# Patient Record
Sex: FEMALE | Race: WHITE | NOT HISPANIC OR LATINO | Employment: FULL TIME | URBAN - METROPOLITAN AREA
[De-identification: names, ages, dates, MRNs, and addresses within clinical notes are randomized per-mention and may not be internally consistent; named-entity substitution may affect disease eponyms.]

---

## 2017-01-26 ENCOUNTER — OFFICE VISIT (OUTPATIENT)
Dept: OBSTETRICS AND GYNECOLOGY | Facility: CLINIC | Age: 37
End: 2017-01-26

## 2017-01-26 VITALS
HEIGHT: 67 IN | WEIGHT: 163 LBS | HEART RATE: 64 BPM | BODY MASS INDEX: 25.58 KG/M2 | SYSTOLIC BLOOD PRESSURE: 107 MMHG | DIASTOLIC BLOOD PRESSURE: 71 MMHG

## 2017-01-26 DIAGNOSIS — R87.810 CERVICAL HIGH RISK HUMAN PAPILLOMAVIRUS (HPV) DNA TEST POSITIVE: Primary | ICD-10-CM

## 2017-01-26 PROCEDURE — 99213 OFFICE O/P EST LOW 20 MIN: CPT | Performed by: OBSTETRICS & GYNECOLOGY

## 2017-01-26 RX ORDER — LORATADINE 10 MG/1
10 TABLET ORAL
COMMUNITY
End: 2021-02-05

## 2017-01-26 RX ORDER — B-COMPLEX WITH VITAMIN C
1 TABLET ORAL DAILY
COMMUNITY

## 2017-01-26 RX ORDER — DESOGESTREL AND ETHINYL ESTRADIOL 0.15-0.03
KIT ORAL
Refills: 1 | COMMUNITY
Start: 2017-01-11 | End: 2017-03-06 | Stop reason: SDUPTHER

## 2017-01-26 RX ORDER — ESCITALOPRAM OXALATE 20 MG/1
20 TABLET ORAL DAILY
Refills: 0 | COMMUNITY
Start: 2016-12-07

## 2017-01-26 NOTE — MR AVS SNAPSHOT
"                        Akilah Sanford   2017 8:30 AM   Office Visit    Dept Phone:  [phone]   Encounter #:  [id number]    Provider:  Jeronimo Malik MD   Department:  [facility]                Your Full Care Plan              Your Updated Medication List          This list is accurate as of: 17  9:48 AM.  Always use your most recent med list.                escitalopram 20 MG tablet   Commonly known as:  LEXAPRO       JULEBER 0.15-30 MG-MCG per tablet   Generic drug:  desogestrel-ethinyl estradiol       loratadine 10 MG tablet   Commonly known as:  CLARITIN       multivitamin tablet               Instructions     None    Patient Instructions History      Upcoming Appointments     Visit Type Date Time Department    GYN FOLLOW UP 2017  8:30 AM [facility]      [employer] Signup     [facility] [employer] allows you to send messages to your doctor, view your test results, renew your prescriptions, schedule appointments, and more. To sign up, go to MyTime and click on the Sign Up Now link in the New User? box. Enter your [employer] Activation Code exactly as it appears below along with the last four digits of your Social Security Number and your Date of Birth () to complete the sign-up process. If you do not sign up before the expiration date, you must request a new code.    [employer] Activation Code: [email or web address]  Expires: 2017  9:48 AM    If you have questions, you can email [email or web address] or call [phone] to talk to our [employer] staff. Remember, [employer] is NOT to be used for urgent needs. For medical emergencies, dial 911.               Other Info from Your Visit           Allergies     No Known Allergies      Reason for Visit     Follow-up Pt has a history of HPV. Pt denied any problems at this time.      Vital Signs     Blood Pressure Pulse Height Weight Last Menstrual Period Body Mass Index    107/71 64 67\" " (170.2 cm) 163 lb (73.9 kg) 01/07/2017 (Exact Date) 25.53 kg/m2    Smoking Status                   Never Smoker           Problems and Diagnoses Noted     Nasal inflammation due to allergen    Depression

## 2017-01-26 NOTE — PROGRESS NOTES
Subjective   Akilah Sanford is a 36 y.o. female.   Cc: HPV  History of Present Illness  Patient's Pap smear showed HPV high risk positive with negative cytology.  The following portions of the patient's history were reviewed and updated as appropriate: allergies, current medications, past family history, past medical history, past social history, past surgical history and problem list.    Review of Systems   All other systems reviewed and are negative.      Objective   Physical Exam   Constitutional: She is oriented to person, place, and time. She appears well-developed and well-nourished.   Genitourinary: Vagina normal and uterus normal. Pelvic exam was performed with patient supine. There is no lesion on the right labia. There is no lesion on the left labia. Cervix exhibits no motion tenderness and no discharge. Right adnexum displays no mass, no tenderness and no fullness. Left adnexum displays no mass, no tenderness and no fullness.   Neurological: She is alert and oriented to person, place, and time.   Skin: Skin is warm and dry.   Psychiatric: She has a normal mood and affect. Her behavior is normal. Judgment and thought content normal.       Assessment/Plan   Akilah was seen today for follow-up.    Diagnoses and all orders for this visit:    Cervical high risk human papillomavirus (HPV) DNA test positive

## 2017-02-01 LAB
CYTOLOGIST CVX/VAG CYTO: NORMAL
CYTOLOGY CVX/VAG DOC THIN PREP: NORMAL
DX ICD CODE: NORMAL
HIV 1 & 2 AB SER-IMP: NORMAL
HPV I/H RISK 4 DNA CVX QL PROBE+SIG AMP: NEGATIVE
OTHER STN SPEC: NORMAL
PATH REPORT.FINAL DX SPEC: NORMAL
STAT OF ADQ CVX/VAG CYTO-IMP: NORMAL

## 2017-02-15 ENCOUNTER — TELEPHONE (OUTPATIENT)
Dept: OBSTETRICS AND GYNECOLOGY | Facility: CLINIC | Age: 37
End: 2017-02-15

## 2017-02-15 NOTE — TELEPHONE ENCOUNTER
----- Message from Jeronimo Malik MD sent at 2/15/2017  3:12 PM EST -----  Contact: pt  Call the patient and tell her that her Pap was negative  ----- Message -----     From: Cee Rowe     Sent: 2/15/2017   3:05 PM       To: Jeronimo Malik MD    Patient called asking for her pap results. Please advise. Pt no is [phone]

## 2017-03-06 RX ORDER — DESOGESTREL AND ETHINYL ESTRADIOL 0.15-0.03
1 KIT ORAL DAILY
Qty: 28 TABLET | Refills: 1 | Status: SHIPPED | OUTPATIENT
Start: 2017-03-06 | End: 2017-03-27 | Stop reason: SDUPTHER

## 2017-03-07 ENCOUNTER — TELEPHONE (OUTPATIENT)
Dept: OBSTETRICS AND GYNECOLOGY | Facility: CLINIC | Age: 37
End: 2017-03-07

## 2017-03-07 NOTE — TELEPHONE ENCOUNTER
----- Message from Jeronimo Malik MD sent at 3/6/2017  5:43 PM EST -----  Contact: PT CALLED   Tell Her the prescription is been sent to the pharmacy  ----- Message -----     From: Zoey Miguel MA     Sent: 3/6/2017   4:25 PM       To: Jeronimo Malik MD        ----- Message -----     From: Julio César Guardado     Sent: 3/6/2017   4:17 PM       To: Zoey Miguel MA    PT NEEDS  A REFILL ON BIRTH CONTROL (1 MO SUPPLY) ANNUAL HAS BEEN KEYA.    THANK YOU-    JULIO CÉSAR

## 2017-03-27 ENCOUNTER — OFFICE VISIT (OUTPATIENT)
Dept: OBSTETRICS AND GYNECOLOGY | Facility: CLINIC | Age: 37
End: 2017-03-27

## 2017-03-27 VITALS
HEART RATE: 55 BPM | SYSTOLIC BLOOD PRESSURE: 122 MMHG | BODY MASS INDEX: 24.8 KG/M2 | DIASTOLIC BLOOD PRESSURE: 79 MMHG | HEIGHT: 67 IN | WEIGHT: 158 LBS

## 2017-03-27 DIAGNOSIS — Z01.419 ENCOUNTER FOR GYNECOLOGICAL EXAMINATION WITHOUT ABNORMAL FINDING: ICD-10-CM

## 2017-03-27 DIAGNOSIS — Z30.41 ENCOUNTER FOR SURVEILLANCE OF CONTRACEPTIVE PILLS: Primary | ICD-10-CM

## 2017-03-27 PROCEDURE — 99395 PREV VISIT EST AGE 18-39: CPT | Performed by: OBSTETRICS & GYNECOLOGY

## 2017-03-27 RX ORDER — DESOGESTREL AND ETHINYL ESTRADIOL 0.15-0.03
1 KIT ORAL DAILY
Qty: 28 TABLET | Refills: 12 | Status: SHIPPED | OUTPATIENT
Start: 2017-03-27 | End: 2019-06-20

## 2017-03-27 NOTE — PROGRESS NOTES
Randolph Sanford is a 37 y.o. female  4, Para 3 AB 1, Living 3.  Last annual 1 year, last pap 1 month, last mammogram 0, last colonoscopy 0.    History of Present Illness    The following portions of the patient's history were reviewed and updated as appropriate: allergies, current medications, past family history, past medical history, past social history, past surgical history and problem list.    Review of Systems   Constitutional: Negative for activity change, fatigue, fever and unexpected weight change.   HENT: Negative for hearing loss, sore throat and voice change.    Respiratory: Negative for cough, chest tightness, shortness of breath and wheezing.    Cardiovascular: Negative for chest pain, palpitations and leg swelling.   Gastrointestinal: Negative for abdominal distention, abdominal pain, anal bleeding, blood in stool, constipation, diarrhea, nausea, rectal pain and vomiting.   Endocrine: Negative for cold intolerance and heat intolerance.   Genitourinary: Negative for difficulty urinating, dyspareunia, dysuria, flank pain, frequency, genital sores, menstrual problem, pelvic pain, urgency, vaginal bleeding, vaginal discharge and vaginal pain.   Musculoskeletal: Negative for arthralgias and back pain.   Skin: Negative for rash.   Allergic/Immunologic: Negative for environmental allergies and food allergies.   Neurological: Negative for dizziness, tremors, syncope, weakness, light-headedness, numbness and headaches.   Hematological: Negative for adenopathy. Does not bruise/bleed easily.   Psychiatric/Behavioral: Negative for agitation, behavioral problems, self-injury, sleep disturbance and suicidal ideas. The patient is not nervous/anxious and is not hyperactive.          Past Medical History:   Diagnosis Date   • History of HPV infection      Menstrual History:  OB History      Para Term  AB TAB SAB Ectopic Multiple Living    4 3 3  1  1   3         Menarche  "age:11  Patient's last menstrual period was 2017.  Period Pattern: Regular  Menstrual Flow: Moderate  Menstrual Control: Thin pad  Dysmenorrhea: None    Past Surgical History:   Procedure Laterality Date   •  SECTION      x2     OB History      Para Term  AB TAB SAB Ectopic Multiple Living    4 3 3  1  1   3        Family History   Problem Relation Age of Onset   • Diverticulitis Mother    • No Known Problems Son    • Throat cancer Paternal Grandfather    • No Known Problems Son      History   Smoking Status   • Never Smoker   Smokeless Tobacco   • Never Used     History   Alcohol Use   • 1.8 - 3.0 oz/week   • 3 - 5 Glasses of wine per week     Health Maintenance   Topic Date Due   • TDAP/TD VACCINES (1 - Tdap) 1997   • INFLUENZA VACCINE  2016       Current Outpatient Prescriptions:   •  escitalopram (LEXAPRO) 20 MG tablet, take 1 tablet by mouth once daily, Disp: , Rfl: 0  •  JULEBER 0.15-30 MG-MCG per tablet, Take 1 tablet by mouth Daily., Disp: 28 tablet, Rfl: 12  •  loratadine (CLARITIN) 10 MG tablet, Take 10 mg by mouth., Disp: , Rfl:   •  Multiple Vitamins-Minerals (MULTIVITAMIN) tablet, Take 1 tablet by mouth., Disp: , Rfl:   Sexual History: Active    STD: HPV      Objective   Vitals:    17 1553   BP: 122/79   Pulse: 55   Weight: 158 lb (71.7 kg)   Height: 67\" (170.2 cm)     Physical Exam   Constitutional: She is oriented to person, place, and time. She appears well-developed and well-nourished.   HENT:   Head: Normocephalic.   Eyes: Pupils are equal, round, and reactive to light.   Neck: Normal range of motion. No thyromegaly present.   Cardiovascular: Normal rate, regular rhythm, normal heart sounds and intact distal pulses.    Pulmonary/Chest: Effort normal and breath sounds normal. No respiratory distress. She exhibits no tenderness. Right breast exhibits no inverted nipple, no mass, no nipple discharge, no skin change and no tenderness. Left breast exhibits " no inverted nipple, no mass, no nipple discharge, no skin change and no tenderness. Breasts are symmetrical.   Abdominal: Soft. Bowel sounds are normal. Hernia confirmed negative in the right inguinal area and confirmed negative in the left inguinal area.   Genitourinary: Rectum normal, vagina normal and uterus normal. Rectal exam shows no external hemorrhoid, no internal hemorrhoid, no fissure, no mass, no tenderness and anal tone normal. No breast tenderness or discharge. Pelvic exam was performed with patient supine. There is no rash, tenderness, lesion or injury on the right labia. There is no rash, tenderness, lesion or injury on the left labia. Uterus is not enlarged and not tender. Cervix exhibits no motion tenderness, no discharge and no friability. Right adnexum displays no mass, no tenderness and no fullness. Left adnexum displays no mass, no tenderness and no fullness.   Lymphadenopathy:     She has no cervical adenopathy.        Right: No inguinal adenopathy present.        Left: No inguinal adenopathy present.   Neurological: She is alert and oriented to person, place, and time. She has normal reflexes.   Skin: Skin is warm and dry.   Psychiatric: She has a normal mood and affect. Her behavior is normal. Judgment and thought content normal.         Assessment/Plan   Akilah was seen today for gynecologic exam.    Diagnoses and all orders for this visit:    Encounter for surveillance of contraceptive pills    Encounter for gynecological examination without abnormal finding    Other orders  -     JULEBER 0.15-30 MG-MCG per tablet; Take 1 tablet by mouth Daily.

## 2019-06-20 ENCOUNTER — OFFICE VISIT (OUTPATIENT)
Dept: OBSTETRICS AND GYNECOLOGY | Facility: CLINIC | Age: 39
End: 2019-06-20

## 2019-06-20 VITALS
SYSTOLIC BLOOD PRESSURE: 118 MMHG | BODY MASS INDEX: 22.51 KG/M2 | HEIGHT: 67 IN | DIASTOLIC BLOOD PRESSURE: 67 MMHG | WEIGHT: 143.4 LBS

## 2019-06-20 DIAGNOSIS — Z00.00 ANNUAL PHYSICAL EXAM: Primary | ICD-10-CM

## 2019-06-20 PROCEDURE — 99396 PREV VISIT EST AGE 40-64: CPT | Performed by: OBSTETRICS & GYNECOLOGY

## 2019-06-20 NOTE — PROGRESS NOTES
ALYSSA Sanford  is a 39 y.o. female who presents for a routine gynecologic exam.  She has no complaints today.  Her cycles are regular and predictable.    Chief Complaint   Patient presents with   • Gynecologic Exam     Patient is here for a annual.       Past Medical History:   Diagnosis Date   • History of HPV infection        Past Surgical History:   Procedure Laterality Date   •  SECTION      x2       Social History     Socioeconomic History   • Marital status:      Spouse name: Not on file   • Number of children: Not on file   • Years of education: Not on file   • Highest education level: Not on file   Tobacco Use   • Smoking status: Never Smoker   • Smokeless tobacco: Never Used   Substance and Sexual Activity   • Alcohol use: Yes     Alcohol/week: 1.8 - 3.0 oz     Types: 3 - 5 Glasses of wine per week   • Drug use: Yes     Types: Marijuana     Comment: Past history   • Sexual activity: Yes     Partners: Male     Birth control/protection: Pill     Comment: Vasectomy       The following portions of the patient's history were reviewed and updated as appropriate: allergies, current medications, past family history, past medical history, past social history, past surgical history and problem list.    Review of Systems   Constitutional: Negative.    HENT: Negative.    Respiratory: Negative.    Cardiovascular: Negative.    Gastrointestinal: Negative.    Genitourinary: Negative.    Musculoskeletal: Negative.    Skin: Negative.    Allergic/Immunologic: Negative.    Psychiatric/Behavioral: Negative.              Physical Exam   Constitutional: She is oriented to person, place, and time. She appears well-developed and well-nourished.   HENT:   Head: Normocephalic and atraumatic.   Cardiovascular: Normal rate and regular rhythm.   Pulmonary/Chest: Effort normal and breath sounds normal. She has no wheezes. She has no rales.   The breasts are homogeneous.  There are no palpable lumps.  Nipple  discharge and axillary adenopathy are absent.   Abdominal: Soft. She exhibits no distension. There is no tenderness.   Genitourinary: Vagina normal and uterus normal. There is no lesion on the right labia. There is no lesion on the left labia. Cervix exhibits no motion tenderness. Right adnexum displays no mass and no tenderness. Left adnexum displays no mass and no tenderness. No vaginal discharge found.   Neurological: She is alert and oriented to person, place, and time.   Skin: Skin is warm and dry.   Nursing note and vitals reviewed.      Assessment    Akilah was seen today for gynecologic exam.    Diagnoses and all orders for this visit:    Annual physical exam        Plan  1. Annual examination and Pap smear performed  2. Counseled regarding ACOG recommendations for mammography every 1 to 2 years between the ages of 40 and 50.  I recommend a mammogram this year.  The patient agrees.    3. Return in about 1 year (around 6/20/2020).    Social History     Tobacco Use   Smoking Status Never Smoker   4.     5.

## 2019-06-26 LAB
CONV .: ABNORMAL
CYTOLOGIST CVX/VAG CYTO: ABNORMAL
CYTOLOGY CVX/VAG DOC CYTO: ABNORMAL
CYTOLOGY CVX/VAG DOC THIN PREP: ABNORMAL
DX ICD CODE: ABNORMAL
DX ICD CODE: ABNORMAL
HIV 1 & 2 AB SER-IMP: ABNORMAL
HPV I/H RISK 1 DNA CVX QL PROBE+SIG AMP: NEGATIVE
OTHER STN SPEC: ABNORMAL
PATHOLOGIST CVX/VAG CYTO: ABNORMAL
RECOM F/U CVX/VAG CYTO: ABNORMAL
STAT OF ADQ CVX/VAG CYTO-IMP: ABNORMAL

## 2019-06-27 ENCOUNTER — PROCEDURE VISIT (OUTPATIENT)
Dept: OBSTETRICS AND GYNECOLOGY | Facility: CLINIC | Age: 39
End: 2019-06-27

## 2019-06-27 ENCOUNTER — APPOINTMENT (OUTPATIENT)
Dept: WOMENS IMAGING | Facility: HOSPITAL | Age: 39
End: 2019-06-27

## 2019-06-27 DIAGNOSIS — Z12.31 VISIT FOR SCREENING MAMMOGRAM: Primary | ICD-10-CM

## 2019-06-27 PROCEDURE — 77067 SCR MAMMO BI INCL CAD: CPT | Performed by: RADIOLOGY

## 2019-06-27 PROCEDURE — 77067 SCR MAMMO BI INCL CAD: CPT | Performed by: OBSTETRICS & GYNECOLOGY

## 2019-07-02 DIAGNOSIS — R92.1 BREAST CALCIFICATIONS ON MAMMOGRAM: Primary | ICD-10-CM

## 2019-07-08 ENCOUNTER — APPOINTMENT (OUTPATIENT)
Dept: WOMENS IMAGING | Facility: HOSPITAL | Age: 39
End: 2019-07-08

## 2019-07-08 PROCEDURE — 77065 DX MAMMO INCL CAD UNI: CPT | Performed by: RADIOLOGY

## 2019-07-09 ENCOUNTER — TELEPHONE (OUTPATIENT)
Dept: OBSTETRICS AND GYNECOLOGY | Facility: CLINIC | Age: 39
End: 2019-07-09

## 2019-07-09 NOTE — TELEPHONE ENCOUNTER
----- Message from Haaj Traylor MD sent at 6/28/2019  3:41 PM EDT -----  Please contact the patient and let her know that her Pap showed atypical cells of undetermined significance.  HPV testing was negative.  Please help her to schedule a repeat Pap for December.  Thank you.

## 2019-07-15 DIAGNOSIS — R92.1 BREAST CALCIFICATIONS ON MAMMOGRAM: ICD-10-CM

## 2019-07-17 ENCOUNTER — DOCUMENTATION (OUTPATIENT)
Dept: OBSTETRICS AND GYNECOLOGY | Facility: CLINIC | Age: 39
End: 2019-07-17

## 2019-07-17 ENCOUNTER — TELEPHONE (OUTPATIENT)
Dept: OBSTETRICS AND GYNECOLOGY | Facility: CLINIC | Age: 39
End: 2019-07-17

## 2019-07-17 NOTE — PROGRESS NOTES
Phone call.  Results were reviewed and discussed with the patient.  The diagnostic mammogram was category 1.  The patient is satisfied with this.

## 2019-07-17 NOTE — TELEPHONE ENCOUNTER
Pt called, requesting the results of her repeat mammo she had done. Please advise.       Pt callback: [phone]

## 2020-07-24 ENCOUNTER — TRANSCRIBE ORDERS (OUTPATIENT)
Dept: ADMINISTRATIVE | Facility: HOSPITAL | Age: 40
End: 2020-07-24

## 2020-07-24 DIAGNOSIS — Z12.31 VISIT FOR SCREENING MAMMOGRAM: Primary | ICD-10-CM

## 2021-02-05 ENCOUNTER — PROCEDURE VISIT (OUTPATIENT)
Dept: OBSTETRICS AND GYNECOLOGY | Facility: CLINIC | Age: 41
End: 2021-02-05

## 2021-02-05 ENCOUNTER — OFFICE VISIT (OUTPATIENT)
Dept: OBSTETRICS AND GYNECOLOGY | Facility: CLINIC | Age: 41
End: 2021-02-05

## 2021-02-05 ENCOUNTER — APPOINTMENT (OUTPATIENT)
Dept: WOMENS IMAGING | Facility: HOSPITAL | Age: 41
End: 2021-02-05

## 2021-02-05 VITALS
HEIGHT: 67 IN | SYSTOLIC BLOOD PRESSURE: 118 MMHG | WEIGHT: 156 LBS | BODY MASS INDEX: 24.48 KG/M2 | DIASTOLIC BLOOD PRESSURE: 62 MMHG

## 2021-02-05 DIAGNOSIS — Z01.411 ENCOUNTER FOR GYNECOLOGICAL EXAMINATION WITH ABNORMAL FINDING: Primary | ICD-10-CM

## 2021-02-05 DIAGNOSIS — Z12.31 VISIT FOR SCREENING MAMMOGRAM: Primary | ICD-10-CM

## 2021-02-05 DIAGNOSIS — N92.6 IRREGULAR MENSTRUAL CYCLE: ICD-10-CM

## 2021-02-05 DIAGNOSIS — Z00.00 ANNUAL PHYSICAL EXAM: ICD-10-CM

## 2021-02-05 PROCEDURE — 77063 BREAST TOMOSYNTHESIS BI: CPT | Performed by: OBSTETRICS & GYNECOLOGY

## 2021-02-05 PROCEDURE — 99396 PREV VISIT EST AGE 40-64: CPT | Performed by: OBSTETRICS & GYNECOLOGY

## 2021-02-05 PROCEDURE — 99212 OFFICE O/P EST SF 10 MIN: CPT | Performed by: OBSTETRICS & GYNECOLOGY

## 2021-02-05 PROCEDURE — 77067 SCR MAMMO BI INCL CAD: CPT | Performed by: OBSTETRICS & GYNECOLOGY

## 2021-02-05 PROCEDURE — 77063 BREAST TOMOSYNTHESIS BI: CPT | Performed by: RADIOLOGY

## 2021-02-05 PROCEDURE — 77067 SCR MAMMO BI INCL CAD: CPT | Performed by: RADIOLOGY

## 2021-02-05 NOTE — PROGRESS NOTES
ALYSSA   Akilah Sanford  is a 40 y.o. female who presents for 2 reasons.  First, she is here for routine gynecologic exam.  Overall, she is feeling well.  Bowels and bladder are functioning normally.  Mammogram was performed today.  Next, the patient reports a change in her menstrual cycle.  Historically, her cycle has occurred once monthly and lasted 4 to 5 days.  It has been regular and predictable.  In December, she only had a 13-day interval between cycles.  Since then, the cycles occurring once monthly since the added cycle occurred.  She does not have intermenstrual bleeding.  She does not have pelvic or abdominal pain.  She does have hot flashes and night sweats as well as mood swings and has noticed vaginal dryness.    Chief Complaint   Patient presents with   • Gynecologic Exam     Patient is here for a annual. Last month patient reports 13 day cycle.       Past Medical History:   Diagnosis Date   • History of HPV infection        Past Surgical History:   Procedure Laterality Date   •  SECTION      x2       Social History     Socioeconomic History   • Marital status:      Spouse name: Not on file   • Number of children: Not on file   • Years of education: Not on file   • Highest education level: Not on file   Tobacco Use   • Smoking status: Never Smoker   • Smokeless tobacco: Never Used   Substance and Sexual Activity   • Alcohol use: Yes     Alcohol/week: 3.0 - 5.0 standard drinks     Types: 3 - 5 Glasses of wine per week   • Drug use: Yes     Types: Marijuana     Comment: Past history   • Sexual activity: Yes     Partners: Male     Birth control/protection: Partner's vasectomy     Comment: Vasectomy       The following portions of the patient's history were reviewed and updated as appropriate: allergies, current medications, past family history, past medical history, past social history, past surgical history and problem list.    Review of Systems      This is positive for hot flashes and  night sweats.  It is positive for mood swings and vaginal dryness.  It is negative for intermenstrual bleeding.  It is negative for fever or chills.  Negative for cold intolerance or fatigue.  Negative for headaches or vision changes.  All other systems are reviewed and are negative.    Physical Exam  Vitals signs and nursing note reviewed.   Constitutional:       Appearance: She is well-developed.   HENT:      Head: Normocephalic and atraumatic.   Cardiovascular:      Rate and Rhythm: Normal rate and regular rhythm.   Pulmonary:      Effort: Pulmonary effort is normal.      Breath sounds: Normal breath sounds. No wheezing or rales.   Chest:      Comments: The breasts are homogeneous.  There are no palpable lumps.  Nipple discharge and axillary adenopathy are absent.  Abdominal:      General: There is no distension.      Palpations: Abdomen is soft.      Tenderness: There is no abdominal tenderness.   Genitourinary:     Labia:         Right: No lesion.         Left: No lesion.       Vagina: Normal. No vaginal discharge.      Cervix: No cervical motion tenderness.      Uterus: Normal. Not enlarged and not tender.       Adnexa:         Right: No mass or tenderness.          Left: No mass or tenderness.     Skin:     General: Skin is warm and dry.   Neurological:      Mental Status: She is alert and oriented to person, place, and time.         Assessment    Diagnoses and all orders for this visit:    1. Encounter for gynecological examination with abnormal finding (Primary)  -     IGP, Rfx Aptima HPV ASCU    2. Annual physical exam    3. Irregular menstrual cycle        Plan  1. Annual examination performed  2. Counseled regarding a cog recommendations for mammography every 1 to 2 years between the ages of 40 and 50.  Mammogram was performed today.  3. Change in the patient's menstrual cycle, which has been accompanied by hot flashes, night sweats, mood swings and vaginal dryness.  Counseled and questions answered.  We  discussed the possibility of checking a follicle-stimulating hormone to assess for impending menopause.  Also, we discussed the possibility of a thyroid-stimulating hormone, as this could represent hypo or hyperthyroidism.  The patient declines this.  We discussed the possibility of managing symptoms with oral contraceptive pills.  The patient declines this as well.  She will call if she would like to pursue further work-up.    4. Return in about 1 year (around 2/5/2022).    Social History     Tobacco Use   Smoking Status Never Smoker   5.

## 2021-02-09 DIAGNOSIS — N64.89 BREAST ASYMMETRY: ICD-10-CM

## 2021-02-09 DIAGNOSIS — R92.8 ABNORMALITY OF RIGHT BREAST ON SCREENING MAMMOGRAM: Primary | ICD-10-CM

## 2021-02-09 LAB
CONV .: NORMAL
CYTOLOGIST CVX/VAG CYTO: NORMAL
CYTOLOGY CVX/VAG DOC CYTO: NORMAL
CYTOLOGY CVX/VAG DOC THIN PREP: NORMAL
DX ICD CODE: NORMAL
HIV 1 & 2 AB SER-IMP: NORMAL
OTHER STN SPEC: NORMAL
STAT OF ADQ CVX/VAG CYTO-IMP: NORMAL

## 2021-02-10 ENCOUNTER — TELEPHONE (OUTPATIENT)
Dept: OBSTETRICS AND GYNECOLOGY | Facility: CLINIC | Age: 41
End: 2021-02-10

## 2021-03-02 ENCOUNTER — APPOINTMENT (OUTPATIENT)
Dept: WOMENS IMAGING | Facility: HOSPITAL | Age: 41
End: 2021-03-02

## 2021-03-02 ENCOUNTER — TELEPHONE (OUTPATIENT)
Dept: OBSTETRICS AND GYNECOLOGY | Facility: CLINIC | Age: 41
End: 2021-03-02

## 2021-03-02 ENCOUNTER — LAB (OUTPATIENT)
Dept: OBSTETRICS AND GYNECOLOGY | Facility: CLINIC | Age: 41
End: 2021-03-02

## 2021-03-02 DIAGNOSIS — N93.9 ABNORMAL UTERINE BLEEDING (AUB): ICD-10-CM

## 2021-03-02 DIAGNOSIS — N93.9 ABNORMAL UTERINE BLEEDING (AUB): Primary | ICD-10-CM

## 2021-03-02 PROCEDURE — 76641 ULTRASOUND BREAST COMPLETE: CPT | Performed by: RADIOLOGY

## 2021-03-02 PROCEDURE — 77065 DX MAMMO INCL CAD UNI: CPT | Performed by: RADIOLOGY

## 2021-03-02 PROCEDURE — 77061 BREAST TOMOSYNTHESIS UNI: CPT | Performed by: RADIOLOGY

## 2021-03-02 PROCEDURE — G0279 TOMOSYNTHESIS, MAMMO: HCPCS | Performed by: RADIOLOGY

## 2021-03-02 NOTE — TELEPHONE ENCOUNTER
Dr. Traylor    Since seeing you, patient states her breasts are very tender. She can hardly touch them. She is also having break through bleeding. States she is spotting every day. She would like to go ahead and do the further work up you two had discussed last month.     Does she need to come in for a visit or is this just coming in for labs?     Please advise, thank you.

## 2021-03-03 LAB
FSH SERPL-ACNC: 3.9 MIU/ML
PROLACTIN SERPL-MCNC: 30.7 NG/ML (ref 4.8–23.3)
TSH SERPL DL<=0.005 MIU/L-ACNC: 0.62 UIU/ML (ref 0.27–4.2)

## 2021-03-04 DIAGNOSIS — R92.8 ABNORMALITY OF RIGHT BREAST ON SCREENING MAMMOGRAM: ICD-10-CM

## 2021-03-04 DIAGNOSIS — N64.89 BREAST ASYMMETRY: ICD-10-CM

## 2021-03-05 ENCOUNTER — TELEPHONE (OUTPATIENT)
Dept: OBSTETRICS AND GYNECOLOGY | Facility: CLINIC | Age: 41
End: 2021-03-05

## 2021-03-08 ENCOUNTER — TELEPHONE (OUTPATIENT)
Dept: OBSTETRICS AND GYNECOLOGY | Facility: CLINIC | Age: 41
End: 2021-03-08

## 2021-03-08 ENCOUNTER — DOCUMENTATION (OUTPATIENT)
Dept: OBSTETRICS AND GYNECOLOGY | Facility: CLINIC | Age: 41
End: 2021-03-08

## 2021-03-08 DIAGNOSIS — E22.1 HYPERPROLACTINEMIA (HCC): Primary | ICD-10-CM

## 2021-03-08 NOTE — PROGRESS NOTES
Phone call.  Results were reviewed and discussed with the patient.  Prolactin levels were elevated.  We discussed possible causes of hyperprolactinemia.  We discussed the fact that Lexapro can be a possible cause.  The patient would like to avoid discontinuing this medication if at all possible.  She does have some breast tenderness and is concerned about the possibility of a pituitary adenoma.  We discussed my recommendations to do an MRI scan of the pituitary.  The patient agrees with this recommendation.  This test has been ordered.

## 2021-03-08 NOTE — TELEPHONE ENCOUNTER
Dr. Traylor     Patient called on 3/5 asking for a return phone call in regards to her labs. [facility] and she is concerned about her Prolactin levels. Would like to discuss with you about these results and what the next step is.     [phone]

## 2021-04-05 ENCOUNTER — HOSPITAL ENCOUNTER (OUTPATIENT)
Dept: MRI IMAGING | Facility: HOSPITAL | Age: 41
Discharge: HOME | End: 2021-04-05
Admitting: OBSTETRICS & GYNECOLOGY

## 2021-04-05 DIAGNOSIS — E22.1 HYPERPROLACTINEMIA (HCC): ICD-10-CM

## 2021-04-05 PROCEDURE — A9577 INJ MULTIHANCE: HCPCS | Performed by: OBSTETRICS & GYNECOLOGY

## 2021-04-05 PROCEDURE — 70553 MRI BRAIN STEM W/O & W/DYE: CPT

## 2021-04-05 PROCEDURE — 0 GADOBENATE DIMEGLUMINE 529 MG/ML SOLUTION: Performed by: OBSTETRICS & GYNECOLOGY

## 2021-04-05 RX ADMIN — GADOBENATE DIMEGLUMINE 15 ML: 529 INJECTION, SOLUTION INTRAVENOUS at 15:41

## 2021-04-06 ENCOUNTER — BULK ORDERING (OUTPATIENT)
Dept: CASE MANAGEMENT | Facility: OTHER | Age: 41
End: 2021-04-06

## 2021-04-06 DIAGNOSIS — Z23 IMMUNIZATION DUE: ICD-10-CM

## 2021-04-07 DIAGNOSIS — F32.A DEPRESSION, UNSPECIFIED DEPRESSION TYPE: ICD-10-CM

## 2021-04-07 DIAGNOSIS — E22.1 HYPERPROLACTINEMIA (HCC): ICD-10-CM

## 2021-04-07 DIAGNOSIS — J32.0 MAXILLARY SINUSITIS, UNSPECIFIED CHRONICITY: ICD-10-CM

## 2021-04-07 DIAGNOSIS — I63.9 WHITE MATTER PERIVENTRICULAR INFARCTION (HCC): ICD-10-CM

## 2021-04-07 DIAGNOSIS — E22.1 HYPERPROLACTINEMIA (HCC): Primary | ICD-10-CM

## 2021-04-08 LAB — PROLACTIN SERPL-MCNC: 9.9 NG/ML (ref 4.8–23.3)

## 2021-04-21 ENCOUNTER — TELEPHONE (OUTPATIENT)
Dept: OBSTETRICS AND GYNECOLOGY | Facility: CLINIC | Age: 41
End: 2021-04-21

## 2021-04-21 NOTE — TELEPHONE ENCOUNTER
Spoke with patient and she is aware of stable findings from recent DX Mammo & US.  She will contact [facility] to schedule her 6 mth follow up for Sept 2021.  I also have her on file to follow.  SR

## 2021-09-03 DIAGNOSIS — N64.89 BREAST ASYMMETRY: ICD-10-CM

## 2021-09-03 DIAGNOSIS — Z09 FOLLOW-UP EXAM, 3-6 MONTHS SINCE PREVIOUS EXAM: Primary | ICD-10-CM

## 2021-09-13 ENCOUNTER — APPOINTMENT (OUTPATIENT)
Dept: WOMENS IMAGING | Facility: HOSPITAL | Age: 41
End: 2021-09-13

## 2021-09-13 PROCEDURE — G0279 TOMOSYNTHESIS, MAMMO: HCPCS | Performed by: RADIOLOGY

## 2021-09-13 PROCEDURE — 77065 DX MAMMO INCL CAD UNI: CPT | Performed by: RADIOLOGY

## 2021-09-13 PROCEDURE — 76641 ULTRASOUND BREAST COMPLETE: CPT | Performed by: RADIOLOGY

## 2021-09-13 PROCEDURE — 77061 BREAST TOMOSYNTHESIS UNI: CPT | Performed by: RADIOLOGY

## 2021-09-15 ENCOUNTER — OFFICE VISIT (OUTPATIENT)
Dept: OBSTETRICS AND GYNECOLOGY | Facility: CLINIC | Age: 41
End: 2021-09-15

## 2021-09-15 VITALS
WEIGHT: 145 LBS | SYSTOLIC BLOOD PRESSURE: 112 MMHG | HEIGHT: 67 IN | BODY MASS INDEX: 22.76 KG/M2 | DIASTOLIC BLOOD PRESSURE: 62 MMHG

## 2021-09-15 DIAGNOSIS — N92.0 MENORRHAGIA WITH REGULAR CYCLE: Primary | ICD-10-CM

## 2021-09-15 PROCEDURE — 99214 OFFICE O/P EST MOD 30 MIN: CPT | Performed by: OBSTETRICS & GYNECOLOGY

## 2021-09-15 NOTE — PROGRESS NOTES
ALYSSA Sanford  is a 41 y.o. female who presents for evaluation of menorrhagia.  The patient reports that her cycles have been regular, predictable and very heavy.  They last approximately 7 days.  Most recently, her August.  Occurred at its appropriate time.  It continued, however, for 2 weeks.  On Monday night, the patient passed 2 clots with tissue.  Since then, her bleeding has decreased considerably and her pain has resolved.  We reviewed the previous work-up which includes a prolactin level.  This was initially elevated and returned to normal on repeat evaluation.  Also, an MRI scan was performed.  This showed no pituitary adenoma, but did show some sinus abnormalities as well as areas of encephalomalacia.  The patient has been counseled regarding these findings and I reviewed them again today.  We discussed my recommendations for a neurology visit to further delineate the meaning of these findings.  The patient declines this.    Chief Complaint   Patient presents with   • Follow-up     Patient is here for irregular cycles and heavy with pain.       Past Medical History:   Diagnosis Date   • History of HPV infection        Past Surgical History:   Procedure Laterality Date   •  SECTION      x2       Social History     Socioeconomic History   • Marital status:      Spouse name: Not on file   • Number of children: Not on file   • Years of education: Not on file   • Highest education level: Not on file   Tobacco Use   • Smoking status: Never Smoker   • Smokeless tobacco: Never Used   Vaping Use   • Vaping Use: Never used   Substance and Sexual Activity   • Alcohol use: Yes     Alcohol/week: 3.0 - 5.0 standard drinks     Types: 3 - 5 Glasses of wine per week   • Drug use: Yes     Types: Marijuana     Comment: Past history   • Sexual activity: Yes     Partners: Male     Birth control/protection: Partner's vasectomy     Comment: Vasectomy       The following portions of the patient's history  were reviewed and updated as appropriate: allergies, current medications, past family history, past medical history, past social history, past surgical history and problem list.    Review of Systems      This is positive for menorrhagia.  It is positive for dysmenorrhea.  It is negative for fever or chills.  Negative for nausea or vomiting.  Negative for unexplained weight change.  Negative for sweats or itching.    Physical Exam  Vitals and nursing note reviewed.   Constitutional:       Appearance: Normal appearance.   Abdominal:      General: There is no distension.      Palpations: Abdomen is soft.      Tenderness: There is no abdominal tenderness.   Genitourinary:     Comments: Normal female external genitalia  The vagina is estrogenized with minimal old blood in the vaginal vault  The cervix is normal in appearance.  It is not tender to palpation.  The uterus is anteverted.  It is mobile within the pelvis.  There is mild tenderness on palpation of the fundus  No adnexal masses are palpable  Neurological:      Mental Status: She is alert and oriented to person, place, and time.         Assessment    Diagnoses and all orders for this visit:    1. Menorrhagia with regular cycle (Primary)        Plan  1. Worsening menorrhagia.  Counseled regarding the pathophysiology of this condition as well as possible causes and treatments.  30 minutes of a 40-minute visit were spent in direct face-to-face counseling on this issue.  On pelvic examination, the uterus is anteverted but normal in size.  The patient is not actively hemorrhaging at this time.  By history, it is possible that the patient has passed an endometrial polyp.  It is uncertain if there might be additional endometrial polyps remaining.  I recommend an ultrasound to further delineate this.  If polyps are found, consideration could be given to hysteroscopy, dilation and curettage, MyoSure polypectomy.  We discussed these procedures as well as the risks,  benefits and alternatives.  I answered the patient's questions.  Further recommendations pending the results of the ultrasound.  If polyps are found, the patient will likely wish to proceed with hysteroscopy, D&C, Myosure.  If there are no polyps, the patient may wish to control her bleeding hormonally with Nexplanon or Mirena.  She may also wish to consider a NovaSure.  We discussed that procedure as well as its risks and benefits.  The patient will consider her options and we will discuss this further pending the results of her ultrasound.    2. No follow-ups on file.    Social History     Tobacco Use   Smoking Status Never Smoker   3.

## 2021-09-17 ENCOUNTER — PREP FOR SURGERY (OUTPATIENT)
Dept: OTHER | Facility: HOSPITAL | Age: 41
End: 2021-09-17

## 2021-09-17 ENCOUNTER — TELEPHONE (OUTPATIENT)
Dept: OBSTETRICS AND GYNECOLOGY | Facility: CLINIC | Age: 41
End: 2021-09-17

## 2021-09-17 ENCOUNTER — DOCUMENTATION (OUTPATIENT)
Dept: OBSTETRICS AND GYNECOLOGY | Facility: CLINIC | Age: 41
End: 2021-09-17

## 2021-09-17 DIAGNOSIS — N93.9 ABNORMAL UTERINE BLEEDING (AUB): Primary | ICD-10-CM

## 2021-09-17 RX ORDER — SODIUM CHLORIDE 0.9 % (FLUSH) 0.9 %
10 SYRINGE (ML) INJECTION AS NEEDED
Status: CANCELLED | OUTPATIENT
Start: 2021-11-03

## 2021-09-17 RX ORDER — SODIUM CHLORIDE 0.9 % (FLUSH) 0.9 %
3 SYRINGE (ML) INJECTION EVERY 12 HOURS SCHEDULED
Status: CANCELLED | OUTPATIENT
Start: 2021-11-03

## 2021-09-17 NOTE — PROGRESS NOTES
Phone call.  Ultrasound was reviewed and discussed with the patient.  She has bleeding for 29 consecutive days.  Because of thickened endometrium I am concerned about polyps.  I recommend hysteroscopy, dilation curettage, MyoSure polypectomy.  The patient has been counseled regarding the benefits risks and alternatives to this plan and she would like to proceed.

## 2021-09-22 ENCOUNTER — TELEPHONE (OUTPATIENT)
Dept: OBSTETRICS AND GYNECOLOGY | Facility: CLINIC | Age: 41
End: 2021-09-22

## 2021-09-22 NOTE — TELEPHONE ENCOUNTER
Peng Parish,  Patient inquiring about the status of her upcoming surgery. Would you be able to advise?  Thank you,  Jordyn

## 2021-10-25 ENCOUNTER — TRANSCRIBE ORDERS (OUTPATIENT)
Dept: OBSTETRICS AND GYNECOLOGY | Facility: CLINIC | Age: 41
End: 2021-10-25

## 2021-10-25 DIAGNOSIS — N93.9 ABNORMAL UTERINE BLEEDING (AUB): Primary | ICD-10-CM

## 2021-11-01 ENCOUNTER — PRE-ADMISSION TESTING (OUTPATIENT)
Dept: PREADMISSION TESTING | Facility: HOSPITAL | Age: 41
End: 2021-11-01

## 2021-11-01 VITALS
TEMPERATURE: 97.1 F | DIASTOLIC BLOOD PRESSURE: 59 MMHG | BODY MASS INDEX: 23.39 KG/M2 | WEIGHT: 149 LBS | HEART RATE: 54 BPM | HEIGHT: 67 IN | RESPIRATION RATE: 20 BRPM | OXYGEN SATURATION: 100 % | SYSTOLIC BLOOD PRESSURE: 114 MMHG

## 2021-11-01 LAB
ANION GAP SERPL CALCULATED.3IONS-SCNC: 9.5 MMOL/L (ref 5–15)
BUN SERPL-MCNC: 18 MG/DL (ref 6–20)
BUN/CREAT SERPL: 24 (ref 7–25)
CALCIUM SPEC-SCNC: 9.2 MG/DL (ref 8.6–10.5)
CHLORIDE SERPL-SCNC: 101 MMOL/L (ref 98–107)
CO2 SERPL-SCNC: 27.5 MMOL/L (ref 22–29)
CREAT SERPL-MCNC: 0.75 MG/DL (ref 0.57–1)
GFR SERPL CREATININE-BSD FRML MDRD: 84 ML/MIN/1.73
GLUCOSE SERPL-MCNC: 106 MG/DL (ref 65–99)
HCG SERPL QL: NEGATIVE
POTASSIUM SERPL-SCNC: 3.8 MMOL/L (ref 3.5–5.2)
SARS-COV-2 ORF1AB RESP QL NAA+PROBE: NOT DETECTED
SODIUM SERPL-SCNC: 138 MMOL/L (ref 136–145)

## 2021-11-01 PROCEDURE — U0004 COV-19 TEST NON-CDC HGH THRU: HCPCS | Performed by: NURSE PRACTITIONER

## 2021-11-01 PROCEDURE — C9803 HOPD COVID-19 SPEC COLLECT: HCPCS | Performed by: NURSE PRACTITIONER

## 2021-11-01 PROCEDURE — 85025 COMPLETE CBC W/AUTO DIFF WBC: CPT | Performed by: OBSTETRICS & GYNECOLOGY

## 2021-11-01 PROCEDURE — 84703 CHORIONIC GONADOTROPIN ASSAY: CPT

## 2021-11-01 PROCEDURE — 36415 COLL VENOUS BLD VENIPUNCTURE: CPT

## 2021-11-01 PROCEDURE — 80048 BASIC METABOLIC PNL TOTAL CA: CPT

## 2021-11-01 RX ORDER — FLUTICASONE PROPIONATE 50 MCG
2 SPRAY, SUSPENSION (ML) NASAL DAILY
COMMUNITY
Start: 2021-09-24

## 2021-11-01 NOTE — DISCHARGE INSTRUCTIONS
Take the following medications the morning of surgery:  LEXAPRO    ARRIVE TO OUTPATIENT SURGERY 11-3-21 AT 8:00AM      If you are on prescription narcotic pain medication to control your pain you may also take that medication the morning of surgery.    General Instructions:  • Do not eat solid food after midnight the night before surgery.  • You may drink clear liquids day of surgery but must stop at least one hour before your hospital arrival time.  • It is beneficial for you to have a clear drink that contains carbohydrates the day of surgery.  We suggest a 12 to 20 ounce bottle of Gatorade or Powerade for non-diabetic patients or a 12 to 20 ounce bottle of G2 or Powerade Zero for diabetic patients. (Pediatric patients, are not advised to drink a 12 to 20 ounce carbohydrate drink)    Clear liquids are liquids you can see through.  Nothing red in color.     Plain water                               Sports drinks  Sodas                                   Gelatin (Jell-O)  Fruit juices without pulp such as white grape juice and apple juice  Popsicles that contain no fruit or yogurt  Tea or coffee (no cream or milk added)  Gatorade / Powerade  G2 / Powerade Zero    • Infants may have breast milk up to four hours before surgery.  • Infants drinking formula may drink formula up to six hours before surgery.   • Patients who avoid smoking, chewing tobacco and alcohol for 4 weeks prior to surgery have a reduced risk of post-operative complications.  Quit smoking as many days before surgery as you can.  • Do not smoke, use chewing tobacco or drink alcohol the day of surgery.   • If applicable bring your C-PAP/ BI-PAP machine.  • Bring any papers given to you in the doctor’s office.  • Wear clean comfortable clothes.  • Do not wear contact lenses, false eyelashes or make-up.  Bring a case for your glasses.   • Bring crutches or walker if applicable.  • Remove all piercings.  Leave jewelry and any other valuables at  home.  • Hair extensions with metal clips must be removed prior to surgery.  • The Pre-Admission Testing nurse will instruct you to bring medications if unable to obtain an accurate list in Pre-Admission Testing.        Preventing a Surgical Site Infection:  • For 2 to 3 days before surgery, avoid shaving with a razor because the razor can irritate skin and make it easier to develop an infection.    • Any areas of open skin can increase the risk of a post-operative wound infection by allowing bacteria to enter and travel throughout the body.  Notify your surgeon if you have any skin wounds / rashes even if it is not near the expected surgical site.  The area will need assessed to determine if surgery should be delayed until it is healed.  • The night prior to surgery shower using a fresh bar of anti-bacterial soap (such as Dial) and clean washcloth.  Sleep in a clean bed with clean clothing.  Do not allow pets to sleep with you.  • Shower on the morning of surgery using a fresh bar of anti-bacterial soap (such as Dial) and clean washcloth.  Dry with a clean towel and dress in clean clothing.  • Ask your surgeon if you will be receiving antibiotics prior to surgery.  • Make sure you, your family, and all healthcare providers clean their hands with soap and water or an alcohol based hand  before caring for you or your wound.    Day of surgery:  Your arrival time is approximately two hours before your scheduled surgery time.  Upon arrival, a Pre-op nurse and Anesthesiologist will review your health history, obtain vital signs, and answer questions you may have.  The only belongings needed at this time will be a list of your home medications and if applicable your C-PAP/BI-PAP machine.  A Pre-op nurse will start an IV and you may receive medication in preparation for surgery, including something to help you relax.     Please be aware that surgery does come with discomfort.  We want to make every effort to  control your discomfort so please discuss any uncontrolled symptoms with your nurse.   Your doctor will most likely have prescribed pain medications.      If you are going home after surgery you will receive individualized written care instructions before being discharged.  A responsible adult must drive you to and from the hospital on the day of your surgery and stay with you for 24 hours.  Discharge prescriptions can be filled by the hospital pharmacy during regular pharmacy hours.  If you are having surgery late in the day/evening your prescription may be e-prescribed to your pharmacy.  Please verify your pharmacy hours or chose a 24 hour pharmacy to avoid not having access to your prescription because your pharmacy has closed for the day.    If you are staying overnight following surgery, you will be transported to your hospital room following the recovery period.  [facility] has all private rooms.    If you have any questions please call Pre-Admission Testing at [phone].  Deductibles and co-payments are collected on the day of service. Please be prepared to pay the required co-pay, deductible or deposit on the day of service as defined by your plan.    Patient Education for Self-Quarantine Process    • Following your COVID testing, we strongly recommend that you wear a mask when you are with other people and practice social distancing.   • Limit your activities to only required outings.  • Wash your hands with soap and water frequently for at least 20 seconds.   • Avoid touching your eyes, nose and mouth with unwashed hands.  • Do not share anything - utensils, drinking glasses, food from the same bowl.   • Sanitize household surfaces daily. Include all high touch areas (door handles, light switches, phones, countertops, etc.)    Call your surgeon immediately if you experience any of the following symptoms:  • Sore Throat  • Shortness of Breath or difficulty  breathing  • Cough  • Chills  • Body soreness or muscle pain  • Headache  • Fever  • New loss of taste or smell  • Do not arrive for your surgery ill.  Your procedure will need to be rescheduled to another time.  You will need to call your physician before the day of surgery to avoid any unnecessary exposure to hospital staff as well as other patients.        CHLORHEXIDINE CLOTH INSTRUCTIONS  The morning of surgery follow these instructions using the Chlorhexidine cloths you've been given.  These steps reduce bacteria on the body.  Do not use the cloths near your eyes, ears mouth, genitalia or on open wounds.  Throw the cloths away after use but do not try to flush them down a toilet.      • Open and remove one cloth at a time from the package.    • Leave the cloth unfolded and begin the bathing.  • Massage the skin with the cloths using gentle pressure to remove bacteria.  Do not scrub harshly.   • Follow the steps below with one 2% CHG cloth per area (6 total cloths).  • One cloth for neck, shoulders and chest.  • One cloth for both arms, hands, fingers and underarms (do underarms last).  • One cloth for the abdomen followed by groin.  • One cloth for right leg and foot including between the toes.  • One cloth for left leg and foot including between the toes.  • The last cloth is to be used for the back of the neck, back and buttocks.    Allow the CHG to air dry 3 minutes on the skin which will give it time to work and decrease the chance of irritation.  The skin may feel sticky until it is dry.  Do not rinse with water or any other liquid or you will lose the beneficial effects of the CHG.  If mild skin irritation occurs, do rinse the skin to remove the CHG.  Report this to the nurse at time of admission.  Do not apply lotions, creams, ointments, deodorants or perfumes after using the clothes. Dress in clean clothes before coming to the hospital.

## 2021-11-02 ENCOUNTER — TELEPHONE (OUTPATIENT)
Dept: OBSTETRICS AND GYNECOLOGY | Facility: CLINIC | Age: 41
End: 2021-11-02

## 2021-11-02 ENCOUNTER — DOCUMENTATION (OUTPATIENT)
Dept: OBSTETRICS AND GYNECOLOGY | Facility: CLINIC | Age: 41
End: 2021-11-02

## 2021-11-02 ENCOUNTER — PREP FOR SURGERY (OUTPATIENT)
Dept: OTHER | Facility: HOSPITAL | Age: 41
End: 2021-11-02

## 2021-11-02 DIAGNOSIS — N92.0 MENORRHAGIA WITH REGULAR CYCLE: Primary | ICD-10-CM

## 2021-11-02 RX ORDER — SODIUM CHLORIDE 0.9 % (FLUSH) 0.9 %
3 SYRINGE (ML) INJECTION EVERY 12 HOURS SCHEDULED
Status: CANCELLED | OUTPATIENT
Start: 2021-11-03

## 2021-11-02 RX ORDER — SODIUM CHLORIDE 0.9 % (FLUSH) 0.9 %
10 SYRINGE (ML) INJECTION AS NEEDED
Status: CANCELLED | OUTPATIENT
Start: 2021-11-03

## 2021-11-02 NOTE — TELEPHONE ENCOUNTER
I spoke to the patient by phone today.  She does want to add a NovaSure endometrial ablation to the other procedures.  I will add a case request for this.  Thank you.

## 2021-11-02 NOTE — TELEPHONE ENCOUNTER
Dr Traylor,    Pt went for her PAT's yesterday and was advised of the procedure that she was having.  Pt thought that she was also going to be having an ablation but the case request does not state that.  Can you please update case request if pt is also having an ablation?    Thanks,Марина

## 2021-11-02 NOTE — PROGRESS NOTES
Phone call.  The patient has menorrhagia and polyps.  She is scheduled currently for a hysteroscopy with MyoSure polypectomy.  She would like to also do a NovaSure.  She has been counseled regarding the benefits, risks and alternatives to this in the past.  I reviewed those benefits and risks today.  The patient would like to proceed.  This can be added to the patient's surgery tomorrow.

## 2021-11-03 ENCOUNTER — ANESTHESIA EVENT (OUTPATIENT)
Dept: PERIOP | Facility: HOSPITAL | Age: 41
End: 2021-11-03

## 2021-11-03 ENCOUNTER — ANESTHESIA (OUTPATIENT)
Dept: PERIOP | Facility: HOSPITAL | Age: 41
End: 2021-11-03

## 2021-11-03 ENCOUNTER — HOSPITAL ENCOUNTER (OUTPATIENT)
Facility: HOSPITAL | Age: 41
Discharge: HOME | End: 2021-11-03
Attending: OBSTETRICS & GYNECOLOGY | Admitting: OBSTETRICS & GYNECOLOGY

## 2021-11-03 VITALS
HEART RATE: 75 BPM | SYSTOLIC BLOOD PRESSURE: 113 MMHG | OXYGEN SATURATION: 100 % | TEMPERATURE: 97.8 F | DIASTOLIC BLOOD PRESSURE: 72 MMHG | RESPIRATION RATE: 16 BRPM

## 2021-11-03 DIAGNOSIS — N93.9 ABNORMAL UTERINE BLEEDING (AUB): ICD-10-CM

## 2021-11-03 DIAGNOSIS — N92.0 MENORRHAGIA WITH REGULAR CYCLE: ICD-10-CM

## 2021-11-03 PROCEDURE — C1782 MORCELLATOR: HCPCS | Performed by: OBSTETRICS & GYNECOLOGY

## 2021-11-03 PROCEDURE — 25010000002 FENTANYL CITRATE (PF) 50 MCG/ML SOLUTION: Performed by: ANESTHESIOLOGY

## 2021-11-03 PROCEDURE — S0260 H&P FOR SURGERY: HCPCS | Performed by: OBSTETRICS & GYNECOLOGY

## 2021-11-03 PROCEDURE — 25010000002 ONDANSETRON PER 1 MG: Performed by: NURSE ANESTHETIST, CERTIFIED REGISTERED

## 2021-11-03 PROCEDURE — 25010000002 HYDROMORPHONE PER 4 MG: Performed by: ANESTHESIOLOGY

## 2021-11-03 PROCEDURE — 25010000002 KETOROLAC TROMETHAMINE PER 15 MG: Performed by: NURSE ANESTHETIST, CERTIFIED REGISTERED

## 2021-11-03 PROCEDURE — 25010000002 FENTANYL CITRATE (PF) 50 MCG/ML SOLUTION: Performed by: NURSE ANESTHETIST, CERTIFIED REGISTERED

## 2021-11-03 PROCEDURE — 25010000002 DEXAMETHASONE PER 1 MG: Performed by: NURSE ANESTHETIST, CERTIFIED REGISTERED

## 2021-11-03 PROCEDURE — 88305 TISSUE EXAM BY PATHOLOGIST: CPT | Performed by: OBSTETRICS & GYNECOLOGY

## 2021-11-03 PROCEDURE — 58563 HYSTEROSCOPY ABLATION: CPT | Performed by: OBSTETRICS & GYNECOLOGY

## 2021-11-03 PROCEDURE — 25010000002 PROPOFOL 10 MG/ML EMULSION: Performed by: NURSE ANESTHETIST, CERTIFIED REGISTERED

## 2021-11-03 RX ORDER — NALOXONE HCL 0.4 MG/ML
0.4 VIAL (ML) INJECTION AS NEEDED
Status: DISCONTINUED | OUTPATIENT
Start: 2021-11-03 | End: 2021-11-03 | Stop reason: HOSPADM

## 2021-11-03 RX ORDER — HYDROCODONE BITARTRATE AND ACETAMINOPHEN 5; 325 MG/1; MG/1
1 TABLET ORAL EVERY 6 HOURS PRN
Qty: 8 TABLET | Refills: 0 | Status: SHIPPED | OUTPATIENT
Start: 2021-11-03

## 2021-11-03 RX ORDER — SODIUM CHLORIDE 0.9 % (FLUSH) 0.9 %
10 SYRINGE (ML) INJECTION AS NEEDED
Status: DISCONTINUED | OUTPATIENT
Start: 2021-11-03 | End: 2021-11-03 | Stop reason: HOSPADM

## 2021-11-03 RX ORDER — LIDOCAINE HYDROCHLORIDE 10 MG/ML
0.5 INJECTION, SOLUTION EPIDURAL; INFILTRATION; INTRACAUDAL; PERINEURAL ONCE AS NEEDED
Status: COMPLETED | OUTPATIENT
Start: 2021-11-03 | End: 2021-11-03

## 2021-11-03 RX ORDER — MAGNESIUM HYDROXIDE 1200 MG/15ML
LIQUID ORAL AS NEEDED
Status: DISCONTINUED | OUTPATIENT
Start: 2021-11-03 | End: 2021-11-03 | Stop reason: HOSPADM

## 2021-11-03 RX ORDER — DIPHENHYDRAMINE HYDROCHLORIDE 50 MG/ML
12.5 INJECTION INTRAMUSCULAR; INTRAVENOUS
Status: DISCONTINUED | OUTPATIENT
Start: 2021-11-03 | End: 2021-11-03 | Stop reason: HOSPADM

## 2021-11-03 RX ORDER — MIDAZOLAM HYDROCHLORIDE 1 MG/ML
1 INJECTION INTRAMUSCULAR; INTRAVENOUS
Status: DISCONTINUED | OUTPATIENT
Start: 2021-11-03 | End: 2021-11-03 | Stop reason: HOSPADM

## 2021-11-03 RX ORDER — FENTANYL CITRATE 50 UG/ML
INJECTION, SOLUTION INTRAMUSCULAR; INTRAVENOUS AS NEEDED
Status: DISCONTINUED | OUTPATIENT
Start: 2021-11-03 | End: 2021-11-03 | Stop reason: SURG

## 2021-11-03 RX ORDER — ONDANSETRON 2 MG/ML
4 INJECTION INTRAMUSCULAR; INTRAVENOUS ONCE AS NEEDED
Status: DISCONTINUED | OUTPATIENT
Start: 2021-11-03 | End: 2021-11-03 | Stop reason: HOSPADM

## 2021-11-03 RX ORDER — KETOROLAC TROMETHAMINE 30 MG/ML
INJECTION, SOLUTION INTRAMUSCULAR; INTRAVENOUS AS NEEDED
Status: DISCONTINUED | OUTPATIENT
Start: 2021-11-03 | End: 2021-11-03 | Stop reason: SURG

## 2021-11-03 RX ORDER — HYDROCODONE BITARTRATE AND ACETAMINOPHEN 5; 325 MG/1; MG/1
1 TABLET ORAL ONCE AS NEEDED
Status: COMPLETED | OUTPATIENT
Start: 2021-11-03 | End: 2021-11-03

## 2021-11-03 RX ORDER — SODIUM CHLORIDE, SODIUM LACTATE, POTASSIUM CHLORIDE, CALCIUM CHLORIDE 600; 310; 30; 20 MG/100ML; MG/100ML; MG/100ML; MG/100ML
9 INJECTION, SOLUTION INTRAVENOUS CONTINUOUS
Status: DISCONTINUED | OUTPATIENT
Start: 2021-11-03 | End: 2021-11-03 | Stop reason: HOSPADM

## 2021-11-03 RX ORDER — DEXAMETHASONE SODIUM PHOSPHATE 10 MG/ML
INJECTION INTRAMUSCULAR; INTRAVENOUS AS NEEDED
Status: DISCONTINUED | OUTPATIENT
Start: 2021-11-03 | End: 2021-11-03 | Stop reason: SURG

## 2021-11-03 RX ORDER — ACETAMINOPHEN 500 MG
500 TABLET ORAL ONCE
Status: COMPLETED | OUTPATIENT
Start: 2021-11-03 | End: 2021-11-03

## 2021-11-03 RX ORDER — EPHEDRINE SULFATE 50 MG/ML
INJECTION, SOLUTION INTRAVENOUS AS NEEDED
Status: DISCONTINUED | OUTPATIENT
Start: 2021-11-03 | End: 2021-11-03 | Stop reason: SURG

## 2021-11-03 RX ORDER — HYDROMORPHONE HYDROCHLORIDE 1 MG/ML
0.25 INJECTION, SOLUTION INTRAMUSCULAR; INTRAVENOUS; SUBCUTANEOUS
Status: DISCONTINUED | OUTPATIENT
Start: 2021-11-03 | End: 2021-11-03 | Stop reason: HOSPADM

## 2021-11-03 RX ORDER — NALBUPHINE HCL 10 MG/ML
2 AMPUL (ML) INJECTION EVERY 4 HOURS PRN
Status: DISCONTINUED | OUTPATIENT
Start: 2021-11-03 | End: 2021-11-03 | Stop reason: HOSPADM

## 2021-11-03 RX ORDER — ONDANSETRON 2 MG/ML
INJECTION INTRAMUSCULAR; INTRAVENOUS AS NEEDED
Status: DISCONTINUED | OUTPATIENT
Start: 2021-11-03 | End: 2021-11-03 | Stop reason: SURG

## 2021-11-03 RX ORDER — BUPIVACAINE HYDROCHLORIDE 2.5 MG/ML
INJECTION, SOLUTION INFILTRATION; PERINEURAL AS NEEDED
Status: DISCONTINUED | OUTPATIENT
Start: 2021-11-03 | End: 2021-11-03 | Stop reason: HOSPADM

## 2021-11-03 RX ORDER — NALBUPHINE HCL 10 MG/ML
10 AMPUL (ML) INJECTION EVERY 4 HOURS PRN
Status: DISCONTINUED | OUTPATIENT
Start: 2021-11-03 | End: 2021-11-03 | Stop reason: HOSPADM

## 2021-11-03 RX ORDER — SODIUM CHLORIDE 0.9 % (FLUSH) 0.9 %
3 SYRINGE (ML) INJECTION EVERY 12 HOURS SCHEDULED
Status: DISCONTINUED | OUTPATIENT
Start: 2021-11-03 | End: 2021-11-03 | Stop reason: HOSPADM

## 2021-11-03 RX ORDER — FENTANYL CITRATE 50 UG/ML
50 INJECTION, SOLUTION INTRAMUSCULAR; INTRAVENOUS
Status: DISCONTINUED | OUTPATIENT
Start: 2021-11-03 | End: 2021-11-03 | Stop reason: HOSPADM

## 2021-11-03 RX ORDER — PROMETHAZINE HYDROCHLORIDE 25 MG/1
25 TABLET ORAL ONCE AS NEEDED
Status: DISCONTINUED | OUTPATIENT
Start: 2021-11-03 | End: 2021-11-03 | Stop reason: HOSPADM

## 2021-11-03 RX ORDER — HYDRALAZINE HYDROCHLORIDE 20 MG/ML
5 INJECTION INTRAMUSCULAR; INTRAVENOUS
Status: DISCONTINUED | OUTPATIENT
Start: 2021-11-03 | End: 2021-11-03 | Stop reason: HOSPADM

## 2021-11-03 RX ORDER — SODIUM CHLORIDE 0.9 % (FLUSH) 0.9 %
10 SYRINGE (ML) INJECTION EVERY 12 HOURS SCHEDULED
Status: DISCONTINUED | OUTPATIENT
Start: 2021-11-03 | End: 2021-11-03 | Stop reason: HOSPADM

## 2021-11-03 RX ORDER — PROPOFOL 10 MG/ML
VIAL (ML) INTRAVENOUS AS NEEDED
Status: DISCONTINUED | OUTPATIENT
Start: 2021-11-03 | End: 2021-11-03 | Stop reason: SURG

## 2021-11-03 RX ORDER — LIDOCAINE HYDROCHLORIDE 20 MG/ML
INJECTION, SOLUTION INFILTRATION; PERINEURAL AS NEEDED
Status: DISCONTINUED | OUTPATIENT
Start: 2021-11-03 | End: 2021-11-03 | Stop reason: SURG

## 2021-11-03 RX ADMIN — HYDROCODONE BITARTRATE AND ACETAMINOPHEN 1 TABLET: 5; 325 TABLET ORAL at 11:46

## 2021-11-03 RX ADMIN — SODIUM CHLORIDE, POTASSIUM CHLORIDE, SODIUM LACTATE AND CALCIUM CHLORIDE: 600; 310; 30; 20 INJECTION, SOLUTION INTRAVENOUS at 10:56

## 2021-11-03 RX ADMIN — KETOROLAC TROMETHAMINE 30 MG: 30 INJECTION, SOLUTION INTRAMUSCULAR at 10:52

## 2021-11-03 RX ADMIN — FENTANYL CITRATE 50 MCG: 50 INJECTION INTRAMUSCULAR; INTRAVENOUS at 11:27

## 2021-11-03 RX ADMIN — ONDANSETRON 4 MG: 2 INJECTION INTRAMUSCULAR; INTRAVENOUS at 10:52

## 2021-11-03 RX ADMIN — LIDOCAINE HYDROCHLORIDE 100 MG: 20 INJECTION, SOLUTION INFILTRATION; PERINEURAL at 10:14

## 2021-11-03 RX ADMIN — PROPOFOL 50 MG: 10 INJECTION, EMULSION INTRAVENOUS at 10:15

## 2021-11-03 RX ADMIN — LIDOCAINE HYDROCHLORIDE 0.5 ML: 10 INJECTION, SOLUTION EPIDURAL; INFILTRATION; INTRACAUDAL; PERINEURAL at 09:15

## 2021-11-03 RX ADMIN — PROPOFOL 150 MG: 10 INJECTION, EMULSION INTRAVENOUS at 10:14

## 2021-11-03 RX ADMIN — DEXAMETHASONE SODIUM PHOSPHATE 8 MG: 10 INJECTION INTRAMUSCULAR; INTRAVENOUS at 10:19

## 2021-11-03 RX ADMIN — EPHEDRINE SULFATE 10 MG: 50 INJECTION INTRAVENOUS at 10:21

## 2021-11-03 RX ADMIN — HYDROMORPHONE HYDROCHLORIDE 0.25 MG: 1 INJECTION, SOLUTION INTRAMUSCULAR; INTRAVENOUS; SUBCUTANEOUS at 11:25

## 2021-11-03 RX ADMIN — SODIUM CHLORIDE, POTASSIUM CHLORIDE, SODIUM LACTATE AND CALCIUM CHLORIDE 9 ML/HR: 600; 310; 30; 20 INJECTION, SOLUTION INTRAVENOUS at 09:15

## 2021-11-03 RX ADMIN — FENTANYL CITRATE 50 MCG: 50 INJECTION INTRAMUSCULAR; INTRAVENOUS at 11:40

## 2021-11-03 RX ADMIN — ACETAMINOPHEN 500 MG: 500 TABLET, FILM COATED ORAL at 09:21

## 2021-11-03 RX ADMIN — HYDROMORPHONE HYDROCHLORIDE 0.25 MG: 1 INJECTION, SOLUTION INTRAMUSCULAR; INTRAVENOUS; SUBCUTANEOUS at 11:20

## 2021-11-03 RX ADMIN — FENTANYL CITRATE 50 MCG: 50 INJECTION INTRAMUSCULAR; INTRAVENOUS at 11:03

## 2021-11-03 RX ADMIN — FENTANYL CITRATE 50 MCG: 50 INJECTION INTRAMUSCULAR; INTRAVENOUS at 10:19

## 2021-11-03 NOTE — ANESTHESIA POSTPROCEDURE EVALUATION
Patient: Akilah Sanford    Procedure Summary     Date: 11/03/21 Room / Location:  [facility]  /  [facility]    Anesthesia Start: 1007 Anesthesia Stop: 1112    Procedure: DILATATION AND CURETTAGE HYSTEROSCOPY WITH MYOSURE ENDOMETRIAL ABLATION WITH NOVASURE (N/A Vagina) Diagnosis:       Abnormal uterine bleeding (AUB)      (Abnormal uterine bleeding (AUB) [N93.9])    Surgeons: Haja Traylor MD Provider: Andrews Aguila MD    Anesthesia Type: general ASA Status: 2          Anesthesia Type: general    Vitals  Vitals Value Taken Time   /71 11/03/21 1201   Temp 36.6 °C (97.8 °F) 11/03/21 1200   Pulse 63 11/03/21 1205   Resp 16 11/03/21 1200   SpO2 100 % 11/03/21 1205   Vitals shown include unvalidated device data.        Post Anesthesia Care and Evaluation    Patient location during evaluation: bedside  Patient participation: complete - patient participated  Level of consciousness: awake and alert  Pain management: adequate  Airway patency: patent  Anesthetic complications: No anesthetic complications  PONV Status: NA  Cardiovascular status: acceptable and hemodynamically stable  Respiratory status: acceptable, spontaneous ventilation and nonlabored ventilation  Hydration status: acceptable    Comments: /72 (BP Location: Left arm, Patient Position: Lying)   Pulse 75   Temp 36.6 °C (97.8 °F) (Temporal)   Resp 16   LMP 08/20/2021 Comment: continued bleeding  SpO2 100%

## 2021-11-03 NOTE — H&P
DAILY PROGRESS NOTE    Patient Identification:  Name: Akilah Sanford  Age: 41 y.o.  Sex: female  :  1980  MRN: [id number]               Subjective:  Interval History: 41-year-old lady who has been followed in the office for menorrhagia.  She has a thickened endometrium on ultrasound and polyps are suspected.  She was counseled regarding options and chose to proceed with hysteroscopy, dilation and curettage, MyoSure polypectomy.  She also would like to add a NovaSure endometrial ablation to these procedures.  She understands the benefits, risks and alternatives to each of these procedures and she would like to proceed.    Objective:    Scheduled Meds:sodium chloride, 10 mL, Intravenous, Q12H  sodium chloride, 3 mL, Intravenous, Q12H      Continuous Infusions:lactated ringers, 9 mL/hr, Last Rate: 9 mL/hr (21 0915)      PRN Meds:midazolam  •  sodium chloride  •  sodium chloride    Vital signs in last 24 hours:  Temp:  [98.2 °F (36.8 °C)] 98.2 °F (36.8 °C)  Heart Rate:  [58] 58  Resp:  [16] 16  BP: (130)/(84) 130/84    Intake/Output:  No intake or output data in the 24 hours ending 21 0950    Exam:  General Appearance:    Alert, cooperative, no distress, appears stated age   Lungs:     Clear to auscultation bilaterally, respirations unlabored    Heart:    Regular rate and rhythm, S1 and S2 normal, no murmur, rub   or gallop   Abdomen:    Soft, nondistended.  No palpable masses.   Extremities:  Equal in size with minimal pedal edema   Skin:   Skin color, texture, turgor normal, no rashes or lesions        Data Review:    Lab Results (last 24 hours)     ** No results found for the last 24 hours. **        Assessment:    Abnormal uterine bleeding (AUB)    Menorrhagia        Plan:  Hysteroscopy, dilation and curettage, MyoSure polypectomy, NovaSure endometrial ablation.  I have counseled the patient regarding the rationale for each of these procedures.  We have discussed the risks, benefits and  alternatives.  Her questions have been answered and she would like to proceed.    Haja Traylor MD  11/3/2021

## 2021-11-03 NOTE — ANESTHESIA PREPROCEDURE EVALUATION
Anesthesia Evaluation     NPO Solid Status: > 8 hours             Airway   Mallampati: II  TM distance: >3 FB  Neck ROM: full  No difficulty expected  Dental - normal exam     Pulmonary - negative pulmonary ROS and normal exam   Cardiovascular - negative cardio ROS and normal exam        Neuro/Psych- negative ROS  GI/Hepatic/Renal/Endo    (+)  GERD well controlled,      Musculoskeletal     Abdominal    Substance History      OB/GYN          Other                        Anesthesia Plan    ASA 2     general   (  D/W R&B of GA including but not limited to: heart, lung, liver, kidney, neurologic problems, positioning injuries, dental damage, corneal abrasion and TMJ.  .)  intravenous induction     Anesthetic plan, all risks, benefits, and alternatives have been provided, discussed and informed consent has been obtained with: patient.

## 2021-11-03 NOTE — OP NOTE
Operative Note      Akilah Sanford  41 y.o.  1980  female  [id number]      11/3/2021    Surgeon(s) and Role:     * Haja Traylor MD - Primary     Pre-op Diagnosis:   One.  Menorrhagia  2.  Endometrial polyps    Post-Op Diagnosis Codes:  Same      Findings/Complications:  Polyps of the anterior wall of the endometrium.  No complications.    Description of procedure:  Procedure(s) and Anesthesia Type:     * DILATATION AND CURETTAGE HYSTEROSCOPY WITH MYOSURE ENDOMETRIAL ABLATION WITH NOVASURE - General  The patient was taken to the operating room where general anesthesia was induced.  She was then placed in dorsal lithotomy position using the candycane stirrups.  Examination under anesthesia revealed a normal-sized uterus which was anteverted.  No adnexal masses were palpable.      At this point, the pelvis and perineum were prepped and draped in the usual sterile fashion and a weighted speculum was placed for exposure.  A paracervical block was placed using Marcaine.  The cervix was then dilated using Hanks dilators.  The Myosure hysteroscope was then introduced into the endometrial cavity.  This was somewhat difficult, as the endometrium was severely anteverted.  The endometrial cavity was intact.  Both tubal ostia were clearly visualized.  There were some polyps on the anterior wall of the endometrium.  No additional pathology was encountered.      At this point, the Myosure reach device was introduced through the hysteroscope and used to resect the 2 polyps on the anterior wall of the endometrium.  These were completely resected.  The remainder of the endometrium was without pathology.  The hysteroscope was slowly withdrawn with no additional pathology encountered on the way out.      The NovaSure sound was then used to measure the endometrial cavity minus the cervix.  This was 6.5 cm.  The NovaSure device was then tested.  It was not deploying properly.  For this reason, it was discarded and a new  NovaSure device was brought to the surgical field.  This was tested and it functioned properly.  The NovaSure device was introduced into the endometrial cavity.  A cavity width of 4.5 cm was obtained.      Cavity integrity test was then performed and passed.  A NovaSure endometrial ablation was then performed.  Power density was 162 W.  At the conclusion of this, the NovaSure device was removed and attention was returned to hysteroscopy.  The hysteroscope was introduced into the endometrial cavity.  The cavity was intact.  There was an excellent endometrial ablation.  The hysteroscope was withdrawn with no pathology encountered on the way out.      All instruments were removed and the operative site was examined.  It was hemostatic.  All counts were correct.  The procedure was terminated and the patient was taken to recovery in stable condition.  Anesthesia= General    Estimated Blood Loss: minimal    Specimens:   Order Name Source Comment Collection Info Order Time   TISSUE PATHOLOGY EXAM Endometrial Curettings  Collected By: Haja Traylor MD 11/3/2021 10:55 AM     Release to patient   Immediate            [unfilled]      Haja Traylor MD  11/3/2021

## 2021-11-03 NOTE — ANESTHESIA PROCEDURE NOTES
Airway  Urgency: elective    Date/Time: 11/3/2021 10:17 AM  Airway not difficult    General Information and Staff    Patient location during procedure: OR  Anesthesiologist: Ayla, Andrews Ray, MD  CRNA: Joseline Ball CRNA    Indications and Patient Condition  Indications for airway management: airway protection    Preoxygenated: yes  MILS maintained throughout  Mask difficulty assessment: 0 - not attempted    Final Airway Details  Final airway type: supraglottic airway      Successful airway: classic  Size 4    Number of attempts at approach: 1  Assessment: lips, teeth, and gum same as pre-op and atraumatic intubation

## 2021-11-04 LAB
LAB AP CASE REPORT: NORMAL
PATH REPORT.FINAL DX SPEC: NORMAL
PATH REPORT.GROSS SPEC: NORMAL

## 2024-07-17 ENCOUNTER — OFFICE VISIT (OUTPATIENT)
Dept: OBSTETRICS AND GYNECOLOGY | Facility: CLINIC | Age: 44
End: 2024-07-17
Payer: COMMERCIAL

## 2024-07-17 VITALS
BODY MASS INDEX: 25.9 KG/M2 | SYSTOLIC BLOOD PRESSURE: 120 MMHG | HEIGHT: 67 IN | DIASTOLIC BLOOD PRESSURE: 71 MMHG | WEIGHT: 165 LBS

## 2024-07-17 DIAGNOSIS — N95.1 MENOPAUSAL SYMPTOMS: Primary | ICD-10-CM

## 2024-07-17 DIAGNOSIS — R45.89 DEPRESSED MOOD: ICD-10-CM

## 2024-07-17 RX ORDER — PROGESTERONE 200 MG/1
200 CAPSULE ORAL DAILY
Qty: 90 CAPSULE | Refills: 3 | Status: SHIPPED | OUTPATIENT
Start: 2024-07-17

## 2024-07-17 RX ORDER — ESTRADIOL 0.05 MG/D
1 PATCH, EXTENDED RELEASE TRANSDERMAL 2 TIMES WEEKLY
Qty: 24 PATCH | Refills: 3 | Status: SHIPPED | OUTPATIENT
Start: 2024-07-18

## 2024-07-17 RX ORDER — FLUOXETINE HYDROCHLORIDE 20 MG/1
60 CAPSULE ORAL DAILY
COMMUNITY
Start: 2024-05-25

## 2024-07-17 NOTE — PROGRESS NOTES
"Chief Complaint   Patient presents with    Follow-up     Pt here today to discuss HRT      SUBJECTIVE:     Akilah Sanford is a 44 y.o.  who presents with c/o worsening menopausal symptoms. She would like to discuss treatment options. Prior care with Dr Traylor. She was last seen in  for endometrial ablation. She then moved to [place] for 3 years. Recently returned to [place] and reestablishing care. This is my first time meeting Akilah Sanford     LMP was in early . Recent FSH with PCP shows postmenopause. Reports menopausal symptoms started around 2 years ago and are worsening.  2 years ago started. C/o severe hot flashes and night sweats, \"extreme\" fatigue, brain fog, c/o joint aches. She has tried some herbal remedies such as flaxseed, garret, and soy products with little improvement in symptoms. Long hx depression and anxiety not improved with antidepressants. Currently on prozac. She was completing CBT in [place]. Denies SI or HI, but states she often feels like she no longer wants to live. Depression/anxiety currently being managed by PCP. Recent extensive work up with PCP for current complaints. Reports positive SHIELA in [place], but negative with PCP-thought to be false positive. She has an upcoming in home sleep study. She is current on AE, mammogram and colonoscopy.    Past Medical History:   Diagnosis Date    Anxiety and depression     GERD (gastroesophageal reflux disease)     OTC    Heavy menses     History of HPV infection     Irregular menses       Past Surgical History:   Procedure Laterality Date     SECTION      X2    D & C HYSTEROSCOPY WITH NOVASURE ENDOMETRIAL ABLATION AND MYOSURE N/A 11/3/2021    Procedure: DILATATION AND CURETTAGE HYSTEROSCOPY WITH MYOSURE ENDOMETRIAL ABLATION WITH NOVASURE;  Surgeon: Haja Traylor MD;  [facility] [facility] [place] [facility] [place]  Service: Obstetrics/Gynecology;  Laterality: N/A;    WISDOM TOOTH EXTRACTION          Review of Systems   Constitutional:  " "Positive for fatigue and fever. Negative for chills.   Gastrointestinal:  Negative for abdominal distention, abdominal pain, constipation and diarrhea.   Endocrine: Positive for heat intolerance. Negative for cold intolerance.   Genitourinary:  Negative for dyspareunia, dysuria, menstrual problem, pelvic pain, vaginal bleeding, vaginal discharge and vaginal pain.   Musculoskeletal:  Positive for arthralgias and myalgias.   Neurological:         +brain fog   Psychiatric/Behavioral:  Positive for dysphoric mood and sleep disturbance. Negative for self-injury and suicidal ideas. The patient is nervous/anxious.        OBJECTIVE:   Vitals:    07/17/24 1107   BP: 120/71   Weight: 74.8 kg (165 lb)   Height: 170.2 cm (67\")        Physical Exam  Constitutional:       General: She is not in acute distress.     Appearance: Normal appearance. She is not ill-appearing, toxic-appearing or diaphoretic.   Cardiovascular:      Rate and Rhythm: Normal rate.   Pulmonary:      Effort: Pulmonary effort is normal.   Musculoskeletal:         General: Normal range of motion.      Cervical back: Normal range of motion.   Neurological:      General: No focal deficit present.      Mental Status: She is alert and oriented to person, place, and time.   Skin:     General: Skin is dry.   Psychiatric:         Mood and Affect: Mood normal.         Behavior: Behavior normal.         Thought Content: Thought content normal.         Judgment: Judgment normal.   Vitals and nursing note reviewed.       Assessment/Plan    Diagnoses and all orders for this visit:    1. Menopausal symptoms (Primary)  -     estradiol (Vivelle-Dot) 0.05 MG/24HR patch; Place 1 patch on the skin as directed by provider 2 (Two) Times a Week.  Dispense: 24 patch; Refill: 3  -     Progesterone (PROMETRIUM) 200 MG capsule; Take 1 capsule by mouth Daily.  Dispense: 90 capsule; Refill: 3  -     Ambulatory Referral to Psychology    2. Depressed mood  -     Ambulatory Referral to " Psychology      Recent detailed work up with PCP, I have reviewed results  Patient counseled that hormone treatment should be used to help with specific symptoms related to menopause such as hot flashes, night sweats and vaginal atrophy.  Hormones should not be given for “general wellbeing” or to avoid aging. The lowest dose hormone that treats symptoms should be used for the shortest about of time possible.  Although estrogen is the most effective treatment for hot flashes, other non hormonal options exist, such as Brisdelle, venlafaxine, or Veozah and should also be considered.  Anyone with an intact uterus should also receive progestogen to prevent uterine overgrowth that can lead to uterine cancer.  All hormone therapy, whether it is synthetic or bio identical, can lead to increased risk of stroke, heart attack and thromboembolic diseases. There are also concerns that in women with a history of endometriosis, HT could result in a reactivation.  Also, migraine headaches may worsen, or leiomyoma's may grow.These adverse events are more likely to develop in the first year of use and in patients who are older than the typical menopausal age.  Risks of estrogen dependent cancers such as breast cancer increase with prolonged use. Attempts to wean hormone therapy should be discussed annually and particularly after three to five years of use.  In a model of joint decision making, the patient has decided to proceed with HRT. Any side effects such as vaginal bleeding or pain should be reported immediately.If she were to have unexplained vaginal bleeding, swelling in leg, chest pain, or other concerning finding, she should stop the HRT and seek immediate medical care. She was counseled that we may have to adjust the dosing to treat the symptoms with an ultimate plan to taper as tolerated.  All questions were answered to apparent satisfaction.  Stressed importance of taking progesterone daily  Recommend f/u in 6 weeks to  re-evaluate symptoms  AE due in December, encouraged to schedule  Depressed mood: Recommend return to CBT. Discussed psychiatry evaluation as well. Keep scheduled f/u with PCP. Go to ED with any SI or HI    Return in about 6 weeks (around 8/28/2024) for Follow up, EH Rawls.    I spent 30 minutes caring for Akilah on this date of service. This time includes time spent by me in the following activities: preparing for the visit, reviewing tests, performing a medically appropriate examination and/or evaluation, counseling and educating the patient/family/caregiver, referring and communicating with other health care professionals, documenting information in the medical record, independently interpreting results and communicating that information with the patient/family/caregiver, care coordination, ordering medications, obtaining a separately obtained history, and reviewing a separately obtained history    EH White  7/17/2024  12:11 EDT

## 2024-08-01 ENCOUNTER — PATIENT MESSAGE (OUTPATIENT)
Dept: OBSTETRICS AND GYNECOLOGY | Facility: CLINIC | Age: 44
End: 2024-08-01
Payer: COMMERCIAL

## 2024-08-07 ENCOUNTER — OFFICE VISIT (OUTPATIENT)
Dept: OBSTETRICS AND GYNECOLOGY | Facility: CLINIC | Age: 44
End: 2024-08-07
Payer: COMMERCIAL

## 2024-08-07 VITALS
HEIGHT: 67 IN | DIASTOLIC BLOOD PRESSURE: 69 MMHG | WEIGHT: 164 LBS | BODY MASS INDEX: 25.74 KG/M2 | SYSTOLIC BLOOD PRESSURE: 104 MMHG

## 2024-08-07 DIAGNOSIS — N95.0 PMB (POSTMENOPAUSAL BLEEDING): Primary | ICD-10-CM

## 2024-08-07 PROCEDURE — 99213 OFFICE O/P EST LOW 20 MIN: CPT | Performed by: NURSE PRACTITIONER

## 2024-08-07 RX ORDER — IBUPROFEN 400 MG/1
TABLET ORAL
COMMUNITY

## 2024-08-07 RX ORDER — ACETAMINOPHEN 500 MG
TABLET ORAL
COMMUNITY

## 2024-08-07 NOTE — PROGRESS NOTES
"Chief Complaint   Patient presents with    Follow-up     Pt here today due to abnormal vaginal spotting/cramping x6 days. U/s today       SUBJECTIVE:     Akilah Sanford is a 44 y.o.  who presents with to f/u with c/o cramping and vaginal bleeding for 6 days. She was recently started on HRT 24. Hx endometrial ablation in  with no menses since ablation. This is a new problem. Prior labs with another provider showed menopause. She reports significant improvement in her mood since starting HRT and is hoping to be able to continue use. She is a patient of Dr. Traylor's.      Past Medical History:   Diagnosis Date    Anxiety and depression     GERD (gastroesophageal reflux disease)     OTC    Heavy menses     History of HPV infection     Irregular menses       Past Surgical History:   Procedure Laterality Date     SECTION      X2    D & C HYSTEROSCOPY WITH NOVASURE ENDOMETRIAL ABLATION AND MYOSURE N/A 11/3/2021    Procedure: DILATATION AND CURETTAGE HYSTEROSCOPY WITH MYOSURE ENDOMETRIAL ABLATION WITH NOVASURE;  Surgeon: Haja Traylor MD;  [place] [facility] [place] [facility] [place]  Service: Obstetrics/Gynecology;  Laterality: N/A;    WISDOM TOOTH EXTRACTION          Review of Systems   Constitutional:  Negative for chills, fatigue and fever.   Gastrointestinal:  Negative for abdominal distention and abdominal pain.   Genitourinary:  Positive for menstrual problem, pelvic pain and vaginal bleeding. Negative for dysuria, vaginal discharge and vaginal pain.       OBJECTIVE:   Vitals:    24 0949   BP: 104/69   Weight: 74.4 kg (164 lb)   Height: 170.2 cm (67\")        Physical Exam  Constitutional:       General: She is not in acute distress.     Appearance: Normal appearance. She is not ill-appearing, toxic-appearing or diaphoretic.   Cardiovascular:      Rate and Rhythm: Normal rate.   Pulmonary:      Effort: Pulmonary effort is normal.   Abdominal:      General: There is no distension.      Palpations: Abdomen " is soft. There is no mass.      Tenderness: There is no abdominal tenderness. There is no guarding.      Hernia: No hernia is present.   Musculoskeletal:         General: Normal range of motion.      Cervical back: Normal range of motion.   Neurological:      General: No focal deficit present.      Mental Status: She is alert and oriented to person, place, and time.   Skin:     General: Skin is dry.   Vitals and nursing note reviewed.       Assessment/Plan    Diagnoses and all orders for this visit:    1. PMB (postmenopausal bleeding) (Primary)  -     Follicle Stimulating Hormone  -     Estradiol  -     US Non-ob Transvaginal; Future    TVUS completed today showing normal uterus and ovaries. ET 0.61cm  She continues to have light bleeding and cramping at this time  Repeat FSH and estradiol today to see if there has been some return of ovarian function vs side effect of new onset HRT.  Plan to continue HRT at this time and repeat TVUS in 8 weeks to follow ET. Advised to call with any heavy vaginal bleeding or severe cramping/pain  Rec motrin PRN pain    Return in about 8 weeks (around 10/2/2024) for Ultrasound, Follow up, EH Rawls.    I spent 20 minutes caring for Akilah on this date of service. This time includes time spent by me in the following activities: preparing for the visit, reviewing tests, performing a medically appropriate examination and/or evaluation, counseling and educating the patient/family/caregiver, referring and communicating with other health care professionals, documenting information in the medical record, independently interpreting results and communicating that information with the patient/family/caregiver, care coordination, ordering test(s), obtaining a separately obtained history, and reviewing a separately obtained history    EH White  8/7/2024  10:45 EDT

## 2024-08-08 LAB — ESTRADIOL SERPL-MCNC: 37 PG/ML

## 2024-10-02 ENCOUNTER — OFFICE VISIT (OUTPATIENT)
Dept: OBSTETRICS AND GYNECOLOGY | Facility: CLINIC | Age: 44
End: 2024-10-02
Payer: COMMERCIAL

## 2024-10-02 VITALS
DIASTOLIC BLOOD PRESSURE: 69 MMHG | WEIGHT: 164 LBS | HEIGHT: 67 IN | BODY MASS INDEX: 25.74 KG/M2 | SYSTOLIC BLOOD PRESSURE: 107 MMHG

## 2024-10-02 DIAGNOSIS — Z79.890 HORMONE REPLACEMENT THERAPY (HRT): ICD-10-CM

## 2024-10-02 DIAGNOSIS — N64.4 BREAST PAIN: ICD-10-CM

## 2024-10-02 DIAGNOSIS — N95.0 POSTMENOPAUSAL BLEEDING: Primary | ICD-10-CM

## 2024-10-02 NOTE — PROGRESS NOTES
"Chief Complaint   Patient presents with    Follow-up     Pt here today to discuss u/s, also states both breasts are painful to touch x2wks        SUBJECTIVE:     Akilah Sanford is a 44 y.o.  who presents to f/u vaginal bleeding after starting HRT. Prior TVUS showed ET 0.61 cm. Repeat TVUS today to reevaluate. She reports occasional spotting since last visit. No bleeding in 2 weeks. C/o bilateral breast and nipple pain X1 month. Nipple pain is worse than breast pain. No masses or nipple drainage. Reports hx elevated prolactin level in  and would like to retest this today. Last screening mammogram 2023, she has repeat screening mammogram scheduled in December.     Past Medical History:   Diagnosis Date    Anxiety and depression     GERD (gastroesophageal reflux disease)     OTC    Heavy menses     History of HPV infection     Irregular menses       Past Surgical History:   Procedure Laterality Date     SECTION      X2    D & C HYSTEROSCOPY WITH NOVASURE ENDOMETRIAL ABLATION AND MYOSURE N/A 11/3/2021    Procedure: DILATATION AND CURETTAGE HYSTEROSCOPY WITH MYOSURE ENDOMETRIAL ABLATION WITH NOVASURE;  Surgeon: Haja Traylor MD;  [place] [facility] [place] [facility] [place]  Service: Obstetrics/Gynecology;  Laterality: N/A;    WISDOM TOOTH EXTRACTION          Review of Systems   Constitutional:  Negative for chills, fatigue and fever.   Gastrointestinal:  Negative for abdominal distention and abdominal pain.   Genitourinary:  Negative for menstrual problem, pelvic pain, vaginal bleeding, vaginal discharge and vaginal pain.        + bilateral breast and nipple pain  - breast mass  -nipple drainage       OBJECTIVE:   Vitals:    10/02/24 0917   BP: 107/69   Weight: 74.4 kg (164 lb)   Height: 170.2 cm (67\")        Physical Exam  Constitutional:       General: She is not in acute distress.     Appearance: Normal appearance. She is not ill-appearing, toxic-appearing or diaphoretic.   Genitourinary:   Breasts:     " Breasts are symmetrical.      Right: Present. No swelling, bleeding, inverted nipple, mass, nipple discharge, skin change, tenderness or breast implant.      Left: Present. No swelling, bleeding, inverted nipple, mass, nipple discharge, skin change, tenderness or breast implant.   Cardiovascular:      Rate and Rhythm: Normal rate.   Pulmonary:      Effort: Pulmonary effort is normal.   Musculoskeletal:         General: Normal range of motion.      Cervical back: Normal range of motion.   Lymphadenopathy:      Upper Body:      Right upper body: No supraclavicular or axillary adenopathy.      Left upper body: No supraclavicular or axillary adenopathy.   Neurological:      General: No focal deficit present.      Mental Status: She is alert and oriented to person, place, and time.   Skin:     General: Skin is warm and dry.   Psychiatric:         Mood and Affect: Mood normal.         Behavior: Behavior normal.         Thought Content: Thought content normal.         Judgment: Judgment normal.   Vitals and nursing note reviewed.         Assessment/Plan    Diagnoses and all orders for this visit:    1. Postmenopausal bleeding (Primary)  -     TSH  -     Follicle Stimulating Hormone    2. Hormone replacement therapy (HRT)    3. Breast pain  -     Prolactin  -     TSH    No vaginal bleeding in 2 weeks. Repeat TVUS today shows appropriate ET at 0.49 cm. Continue HRT at this time. She is feeling well with HRT and reports significant improvement in her mood with use.   FSH not collected at last visit, will obtain today  Breast pain: normal breast exam. Discussed HRT use may be contributing factor. Recommend snug supportive bra throughout the day, sleep in sports bra, decreased intake of caffeine. Recommend vitamin E supplement (400 IU BID) for breast pain. Call if no improvement in 2 weeks, will plan for diagnostic mammogram at that time. Repeat Prolactin and checking TSH. Discussed if prolactin is elevated will plan to collect  fasting. She is not fasting at this time    Return if symptoms worsen or fail to improve.    I spent 30 minutes caring for Akilah on this date of service. This time includes time spent by me in the following activities: preparing for the visit, reviewing tests, performing a medically appropriate examination and/or evaluation, counseling and educating the patient/family/caregiver, referring and communicating with other health care professionals, documenting information in the medical record, independently interpreting results and communicating that information with the patient/family/caregiver, ordering test(s), obtaining a separately obtained history, and reviewing a separately obtained history    Laxmi Hanna, HE  10/2/2024  09:40 EDT

## 2024-10-03 LAB
FSH SERPL-ACNC: 20.5 MIU/ML
PROLACTIN SERPL-MCNC: 18.3 NG/ML (ref 4.8–33.4)
TSH SERPL DL<=0.005 MIU/L-ACNC: 0.72 UIU/ML (ref 0.27–4.2)

## 2024-10-29 RX ORDER — CONJUGATED ESTROGENS/BAZEDOXIFENE .45; 2 MG/1; MG/1
1 TABLET, FILM COATED ORAL DAILY
Qty: 30 TABLET | Refills: 3 | Status: SHIPPED | OUTPATIENT
Start: 2024-10-29

## 2024-12-06 ENCOUNTER — PROCEDURE VISIT (OUTPATIENT)
Dept: OBSTETRICS AND GYNECOLOGY | Facility: CLINIC | Age: 44
End: 2024-12-06
Payer: COMMERCIAL

## 2024-12-06 ENCOUNTER — OFFICE VISIT (OUTPATIENT)
Dept: OBSTETRICS AND GYNECOLOGY | Facility: CLINIC | Age: 44
End: 2024-12-06
Payer: COMMERCIAL

## 2024-12-06 VITALS — WEIGHT: 169 LBS | DIASTOLIC BLOOD PRESSURE: 82 MMHG | SYSTOLIC BLOOD PRESSURE: 122 MMHG | BODY MASS INDEX: 26.47 KG/M2

## 2024-12-06 DIAGNOSIS — N95.1 MENOPAUSAL SYMPTOMS: ICD-10-CM

## 2024-12-06 DIAGNOSIS — Z00.00 ANNUAL PHYSICAL EXAM: Primary | ICD-10-CM

## 2024-12-06 DIAGNOSIS — Z12.31 VISIT FOR SCREENING MAMMOGRAM: Primary | ICD-10-CM

## 2024-12-06 RX ORDER — CONJUGATED ESTROGENS/BAZEDOXIFENE .45; 2 MG/1; MG/1
1 TABLET, FILM COATED ORAL DAILY
Qty: 90 TABLET | Refills: 3 | Status: SHIPPED | OUTPATIENT
Start: 2024-12-06

## 2024-12-06 NOTE — PROGRESS NOTES
ALYSSA Sanford  is a 44 y.o. female who presents for a routine gynecologic exam.  Overall, she is feeling well.  Bowels and bladder are functioning normally.  Mammogram was performed today.  The patient is current with colon cancer screening.  Next, she had some menopausal symptoms.  These were initially treated with hormone replacement.  This controlled her symptoms, but she had vaginal bleeding.  After negative workup, the patient was changed to Duavee and has been doing well ever since.  She would like to continue this.    Chief Complaint   Patient presents with    Annual Exam     Carol today  History abnormal paps       Past Medical History:   Diagnosis Date    Anxiety and depression     GERD (gastroesophageal reflux disease)     OTC    Heavy menses     History of HPV infection     Irregular menses        Past Surgical History:   Procedure Laterality Date     SECTION      X2    D & C HYSTEROSCOPY WITH NOVASURE ENDOMETRIAL ABLATION AND MYOSURE N/A 11/3/2021    Procedure: DILATATION AND CURETTAGE HYSTEROSCOPY WITH MYOSURE ENDOMETRIAL ABLATION WITH NOVASURE;  Surgeon: Haja Traylor MD;  [place] [facility] [place] [facility] [place]  Service: Obstetrics/Gynecology;  Laterality: N/A;    WISDOM TOOTH EXTRACTION         Social History     Socioeconomic History    Marital status:    Tobacco Use    Smoking status: Never    Smokeless tobacco: Never   Vaping Use    Vaping status: Never Used   Substance and Sexual Activity    Alcohol use: Yes     Alcohol/week: 3.0 - 5.0 standard drinks of alcohol     Types: 3 - 5 Glasses of wine per week     Comment: SOCIALLY TO RARELY    Drug use: Not Currently     Types: Marijuana     Comment: Past history    Sexual activity: Yes     Partners: Male     Birth control/protection: Vasectomy     Comment: Vasectomy       The following portions of the patient's history were reviewed and updated as appropriate: allergies, current medications, past family history, past medical history, past  social history, past surgical history and problem list.    Review of Systems   Constitutional: Negative.    HENT: Negative.     Respiratory: Negative.     Cardiovascular: Negative.    Gastrointestinal: Negative.    Genitourinary: Negative.    Musculoskeletal: Negative.    Skin: Negative.    Allergic/Immunologic: Negative.    Psychiatric/Behavioral: Negative.               Physical Exam  Vitals and nursing note reviewed.   Constitutional:       Appearance: She is well-developed.   HENT:      Head: Normocephalic and atraumatic.   Cardiovascular:      Rate and Rhythm: Normal rate and regular rhythm.   Pulmonary:      Effort: Pulmonary effort is normal.      Breath sounds: Normal breath sounds. No wheezing or rales.   Chest:      Comments: The breasts are homogeneous.  There are no palpable lumps.  Nipple discharge and axillary adenopathy are absent.  Abdominal:      General: There is no distension.      Palpations: Abdomen is soft.      Tenderness: There is no abdominal tenderness.   Genitourinary:     Labia:         Right: No lesion.         Left: No lesion.       Vagina: Normal. No vaginal discharge.      Cervix: No cervical motion tenderness.      Uterus: Normal. Not enlarged and not tender.       Adnexa:         Right: No mass or tenderness.          Left: No mass or tenderness.     Skin:     General: Skin is warm and dry.   Neurological:      Mental Status: She is alert and oriented to person, place, and time.         Assessment    Diagnoses and all orders for this visit:    1. Annual physical exam (Primary)    2. Menopausal symptoms    Other orders  -     Conj Estrogens-Bazedoxifene (Duavee) 0.45-20 MG tablet; Take 1 tablet by mouth Daily.  Dispense: 90 tablet; Refill: 3        Plan  Annual examination performed  Counseled regarding the importance of regular screening mammograms.  Mammogram was performed today.  The patient is current with colon cancer screening.  Menopausal symptoms.  This is responding well to  Duavee.  The patient no longer has bleeding.  We discussed benefits and risks of continuing.  The patient would like to continue her Duavee.  Prescription has been refilled.    Return in about 1 year (around 12/6/2025).    Social History     Tobacco Use   Smoking Status Never   Smokeless Tobacco Never

## 2024-12-12 LAB
CONV .: NORMAL
CYTOLOGIST CVX/VAG CYTO: NORMAL
CYTOLOGY CVX/VAG DOC CYTO: NORMAL
CYTOLOGY CVX/VAG DOC THIN PREP: NORMAL
DX ICD CODE: NORMAL
Lab: NORMAL
OTHER STN SPEC: NORMAL
STAT OF ADQ CVX/VAG CYTO-IMP: NORMAL

## 2025-02-06 ENCOUNTER — TELEPHONE (OUTPATIENT)
Dept: OBSTETRICS AND GYNECOLOGY | Facility: CLINIC | Age: 45
End: 2025-02-06
Payer: COMMERCIAL

## 2025-02-06 NOTE — TELEPHONE ENCOUNTER
SHOSHANA DE LA PAZ    [phone]    PT STARTED BLOOD THINNERS 1/14/25    STARTED PMB  TODAY LIKE A REGULAR TO HEAVY FLOW SOME CLOTS

## 2025-02-06 NOTE — TELEPHONE ENCOUNTER
[place] pt. AE & Mx 12/6/24, postmenopausal. States she started blood thinners on 1/14/25. Started PMB today like a regular to heavy flow with some clots. Sidney. Thank you

## 2025-02-06 NOTE — TELEPHONE ENCOUNTER
Filomena do we have any availability to see her next week with u/s? If she is saturating a pad an hour for 2 consecutive hours she should go to the ED. Thank you

## 2025-02-06 NOTE — TELEPHONE ENCOUNTER
I looked in Dr Traylor's appointments next week to have with US and found nothing. Pt is on blood thinners and having post menopausal bleeding regular to heavy flow with some clots. Can you help? Thank you

## 2025-02-07 NOTE — TELEPHONE ENCOUNTER
Pt could not schedule for next week so scheduled for 2/20. Stated that she is no longer bleeding heavy, states its light spotting. Pt was wanting to know if could be a possible cyst rupture as she was having pain on right side the day before she had the bleeding.

## 2025-02-20 ENCOUNTER — OFFICE VISIT (OUTPATIENT)
Dept: OBSTETRICS AND GYNECOLOGY | Facility: CLINIC | Age: 45
End: 2025-02-20
Payer: COMMERCIAL

## 2025-02-20 VITALS — BODY MASS INDEX: 26.53 KG/M2 | HEIGHT: 67 IN | WEIGHT: 169 LBS

## 2025-02-20 DIAGNOSIS — N95.0 PMB (POSTMENOPAUSAL BLEEDING): Primary | ICD-10-CM

## 2025-02-20 RX ORDER — METOPROLOL SUCCINATE 25 MG/1
25 TABLET, EXTENDED RELEASE ORAL DAILY
COMMUNITY
Start: 2025-01-14 | End: 2025-03-17

## 2025-02-20 NOTE — PROGRESS NOTES
"Chief Complaint   Patient presents with    Follow-up     Pt here today to discuss u/s had a day of heavy bleeding on  has just been spotting since       SUBJECTIVE:     Akilah Sanford is a 44 y.o.  who presents with c/o PMB. Prior endometrial ablation. She has had several episodes of vaginal bleeding since starting HRT in August. Prior to this she has been amenorrheic since ablation in . This is a new problem. Reports 2 weeks ago while at work she had a large \"gush\" of blood that saturated her clothing. Following this she had light spotting for several hours. No further bleeding since. Denies cramping with episode of bleeding. No concerns for STI. Declines examination today    She was recently diagnosed with A fib and was started on a blood thinner 25. She is followed by cardiology and reports she discussed her HRT with them multiple times and she was not advised to d/c use. Reports she was told HRT was not the cause of a fib. She is very happy with use and states she will be very upset if she has to d/c use as she feels HRT has significantly improved her quality of life-most importantly her mood.     Past Medical History:   Diagnosis Date    Anxiety and depression     GERD (gastroesophageal reflux disease)     OTC    Heavy menses     History of HPV infection     Irregular menses       Past Surgical History:   Procedure Laterality Date     SECTION      X2    D & C HYSTEROSCOPY WITH NOVASURE ENDOMETRIAL ABLATION AND MYOSURE N/A 11/3/2021    Procedure: DILATATION AND CURETTAGE HYSTEROSCOPY WITH MYOSURE ENDOMETRIAL ABLATION WITH NOVASURE;  Surgeon: Haja Traylor MD;  [place] [facility] [place] [facility] [place]  Service: Obstetrics/Gynecology;  Laterality: N/A;    WISDOM TOOTH EXTRACTION          Review of Systems   Constitutional:  Negative for chills, fatigue and fever.   Gastrointestinal:  Negative for abdominal distention and abdominal pain.   Genitourinary:  Positive for vaginal bleeding. Negative for " "dyspareunia, dysuria, pelvic pain, vaginal discharge and vaginal pain.       OBJECTIVE:   Vitals:    02/20/25 1419   Weight: 76.7 kg (169 lb)   Height: 170.2 cm (67\")        Physical Exam  Constitutional:       General: She is not in acute distress.     Appearance: Normal appearance. She is not ill-appearing, toxic-appearing or diaphoretic.   Cardiovascular:      Rate and Rhythm: Normal rate.   Pulmonary:      Effort: Pulmonary effort is normal.   Musculoskeletal:      Cervical back: Normal range of motion.   Neurological:      General: No focal deficit present.      Mental Status: She is alert and oriented to person, place, and time.   Skin:     General: Skin is dry.   Psychiatric:         Mood and Affect: Mood normal.         Behavior: Behavior normal.         Thought Content: Thought content normal.         Judgment: Judgment normal.   Vitals and nursing note reviewed.       Assessment/Plan    Diagnoses and all orders for this visit:    1. PMB (postmenopausal bleeding) (Primary)  -     Follicle Stimulating Hormone    Bleeding is resolved at this time. Repeating FSH levels today. Discussed how FSH levels can be variable during perimenopause. She has in the past had FSH that showed menopause. TUVS repeated today with ET very reassuring at 0.36 cm. Discussed cervical polyps. She reports benign AE in December and declines exam today. Call with any further PMB  New onset A fib. 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 We reviewed again today risks for DVT and stroke. She reports that she did discuss with cardiology she had recently started on HRT and states she was reassured this was not the source. She has f/u with cardiology in May. I have reviewed cardiology notes and there is no indication to stop HRT at this time.   ASCVD risk 0.3%   CHADS2 score is 1    Return if symptoms worsen or fail to improve.    I spent 35 minutes caring for Akilah on this date of service. This time includes time spent by me in the " following activities: preparing for the visit, reviewing tests, performing a medically appropriate examination and/or evaluation, counseling and educating the patient/family/caregiver, referring and communicating with other health care professionals, documenting information in the medical record, independently interpreting results and communicating that information with the patient/family/caregiver, care coordination, ordering medications, ordering test(s), ordering procedure(s), obtaining a separately obtained history, and reviewing a separately obtained history    EH White  2/20/2025  17:29 EST

## 2025-02-21 LAB — FSH SERPL-ACNC: 83.5 MIU/ML

## 2025-02-27 RX ORDER — CONJUGATED ESTROGENS/BAZEDOXIFENE .45; 2 MG/1; MG/1
1 TABLET, FILM COATED ORAL DAILY
Qty: 30 TABLET | OUTPATIENT
Start: 2025-02-27

## (undated) DEVICE — DEV TISS REMOV MYOSURE REACH

## (undated) DEVICE — PROB ABL ENDOMTRL NOVASURE/G4 W/SURESND

## (undated) DEVICE — GLV SURG BIOGEL LTX PF 7 1/2

## (undated) DEVICE — OSC HYSTEROSCOPY: Brand: MEDLINE INDUSTRIES, INC.

## (undated) DEVICE — NDL HYPO ECLPS SFTY 22G 1 1/2IN

## (undated) DEVICE — SEAL HYSTERSCOPE/OUTFLOW CHANNEL MYOSURE